# Patient Record
Sex: FEMALE | Race: WHITE | NOT HISPANIC OR LATINO | URBAN - METROPOLITAN AREA
[De-identification: names, ages, dates, MRNs, and addresses within clinical notes are randomized per-mention and may not be internally consistent; named-entity substitution may affect disease eponyms.]

---

## 2019-01-18 ENCOUNTER — APPOINTMENT (RX ONLY)
Dept: URBAN - METROPOLITAN AREA CLINIC 349 | Facility: CLINIC | Age: 39
Setting detail: DERMATOLOGY
End: 2019-01-18

## 2019-01-18 DIAGNOSIS — Z80.8 FAMILY HISTORY OF MALIGNANT NEOPLASM OF OTHER ORGANS OR SYSTEMS: ICD-10-CM

## 2019-01-18 DIAGNOSIS — B07.8 OTHER VIRAL WARTS: ICD-10-CM

## 2019-01-18 DIAGNOSIS — L57.0 ACTINIC KERATOSIS: ICD-10-CM

## 2019-01-18 DIAGNOSIS — D22 MELANOCYTIC NEVI: ICD-10-CM | Status: STABLE

## 2019-01-18 DIAGNOSIS — L71.8 OTHER ROSACEA: ICD-10-CM

## 2019-01-18 DIAGNOSIS — Z71.89 OTHER SPECIFIED COUNSELING: ICD-10-CM

## 2019-01-18 DIAGNOSIS — L81.4 OTHER MELANIN HYPERPIGMENTATION: ICD-10-CM

## 2019-01-18 PROBLEM — D48.5 NEOPLASM OF UNCERTAIN BEHAVIOR OF SKIN: Status: ACTIVE | Noted: 2019-01-18

## 2019-01-18 PROBLEM — D22.5 MELANOCYTIC NEVI OF TRUNK: Status: ACTIVE | Noted: 2019-01-18

## 2019-01-18 PROCEDURE — ? OTHER

## 2019-01-18 PROCEDURE — ? MEDICAL PHOTOGRAPHY REVIEW

## 2019-01-18 PROCEDURE — ? PHOTO-DOCUMENTATION

## 2019-01-18 PROCEDURE — ? BODY PHOTOGRAPHY

## 2019-01-18 PROCEDURE — 17110 DESTRUCTION B9 LES UP TO 14: CPT

## 2019-01-18 PROCEDURE — 99214 OFFICE O/P EST MOD 30 MIN: CPT | Mod: 25

## 2019-01-18 PROCEDURE — ? LIQUID NITROGEN

## 2019-01-18 PROCEDURE — 17000 DESTRUCT PREMALG LESION: CPT | Mod: 59

## 2019-01-18 PROCEDURE — ? LIQUID NITROGEN (COSMETIC)

## 2019-01-18 PROCEDURE — ? PRESCRIPTION

## 2019-01-18 PROCEDURE — ? COUNSELING

## 2019-01-18 PROCEDURE — ? EDUCATIONAL RESOURCES PROVIDED

## 2019-01-18 RX ORDER — IVERMECTIN 10 MG/G
CREAM TOPICAL
Qty: 1 | Refills: 2 | Status: ERX | COMMUNITY
Start: 2019-01-18

## 2019-01-18 RX ADMIN — IVERMECTIN: 10 CREAM TOPICAL at 15:14

## 2019-01-18 ASSESSMENT — LOCATION DETAILED DESCRIPTION DERM
LOCATION DETAILED: RIGHT SUPERIOR MEDIAL UPPER BACK
LOCATION DETAILED: RIGHT MEDIAL MALAR CHEEK
LOCATION DETAILED: RIGHT POSTERIOR ANKLE
LOCATION DETAILED: LEFT DISTAL PRETIBIAL REGION
LOCATION DETAILED: RIGHT CENTRAL MALAR CHEEK
LOCATION DETAILED: INFERIOR THORACIC SPINE
LOCATION DETAILED: LEFT CENTRAL MALAR CHEEK
LOCATION DETAILED: RIGHT MID-UPPER BACK
LOCATION DETAILED: RIGHT MEDIAL UPPER BACK
LOCATION DETAILED: GLABELLA

## 2019-01-18 ASSESSMENT — SEVERITY ASSESSMENT OVERALL AMONG ALL PATIENTS
IN YOUR EXPERIENCE, AMONG ALL PATIENTS YOU HAVE SEEN WITH THIS CONDITION, HOW SEVERE IS THIS PATIENT'S CONDITION?: MILD TO MODERATE

## 2019-01-18 ASSESSMENT — LOCATION ZONE DERM
LOCATION ZONE: TRUNK
LOCATION ZONE: LEG
LOCATION ZONE: FACE

## 2019-01-18 ASSESSMENT — LOCATION SIMPLE DESCRIPTION DERM
LOCATION SIMPLE: UPPER BACK
LOCATION SIMPLE: RIGHT UPPER BACK
LOCATION SIMPLE: LEFT CHEEK
LOCATION SIMPLE: GLABELLA
LOCATION SIMPLE: RIGHT ANKLE
LOCATION SIMPLE: RIGHT CHEEK
LOCATION SIMPLE: LEFT PRETIBIAL REGION

## 2019-01-18 NOTE — PROCEDURE: MEDICAL PHOTOGRAPHY REVIEW
Review Findings: no new or changing lesions
Number Of Photographs Reviewed: 2
Detail Level: Generalized

## 2019-01-18 NOTE — PROCEDURE: OTHER
Note Text (......Xxx Chief Complaint.): This diagnosis correlates with the
Detail Level: Detailed
Other (Free Text): Patient will call back if Soolantra is not working and orcea will be called in.

## 2019-01-18 NOTE — PROCEDURE: BODY PHOTOGRAPHY
Was The Entire Body Photographed (Cannot Bill Unless Entire Body Photographed)?: No
Reason For Photography: The patient is obtaining body photography to observe existing suspicious moles and or monitor for the appearance of any new lesions.
Detail Level: Generalized
Consent: Written consent obtained, risks reviewed for whole body photography. Patient understands that photograph costs may not be covered by insurance, and patient is ultimately responsible for payment.
Number Of Photographs (Optional- Will Not Render If 0): 3
Whole Body Statement: The whole body was photographed today.

## 2019-01-18 NOTE — PROCEDURE: LIQUID NITROGEN (COSMETIC)
Detail Level: Detailed
Render Post-Care Instructions In Note?: no
Post-Care Instructions: I reviewed with the patient in detail post-care instructions. Patient is to wear sunprotection, and avoid picking at any of the treated lesions. Pt may apply Vaseline to crusted or scabbing areas.
Price (Use Numbers Only, No Special Characters Or $): 25
Consent: The patient's consent was obtained including but not limited to risks of crusting, scabbing, blistering, scarring, darker or lighter pigmentary change, recurrence, incomplete removal and infection. The patient understands that the procedure is cosmetic in nature and is not covered by insurance.

## 2019-01-18 NOTE — PROCEDURE: LIQUID NITROGEN
Number Of Freeze-Thaw Cycles: 2 freeze-thaw cycles
Medical Necessity Information: It is in your best interest to select a reason for this procedure from the list below. All of these items fulfill various CMS LCD requirements except the new and changing color options.
Post-Care Instructions: I reviewed with the patient in detail post-care instructions. Patient is to wear sunprotection, and avoid picking at any of the treated lesions. Pt may apply Vaseline to crusted or scabbing areas.
Detail Level: Detailed
Include Z78.9 (Other Specified Conditions Influencing Health Status) As An Associated Diagnosis?: Yes
Medical Necessity Clause: This procedure was medically necessary because the lesions that were treated were:
Consent: The patient's consent was obtained including but not limited to risks of crusting, scabbing, blistering, scarring, darker or lighter pigmentary change, recurrence, incomplete removal and infection.
Duration Of Freeze Thaw-Cycle (Seconds): 5-10
Duration Of Freeze Thaw-Cycle (Seconds): 3
Render Post-Care Instructions In Note?: no

## 2020-01-17 ENCOUNTER — APPOINTMENT (RX ONLY)
Dept: URBAN - METROPOLITAN AREA CLINIC 349 | Facility: CLINIC | Age: 40
Setting detail: DERMATOLOGY
End: 2020-01-17

## 2020-01-17 DIAGNOSIS — L20.89 OTHER ATOPIC DERMATITIS: ICD-10-CM

## 2020-01-17 DIAGNOSIS — L21.8 OTHER SEBORRHEIC DERMATITIS: ICD-10-CM

## 2020-01-17 DIAGNOSIS — Z80.8 FAMILY HISTORY OF MALIGNANT NEOPLASM OF OTHER ORGANS OR SYSTEMS: ICD-10-CM

## 2020-01-17 PROBLEM — L20.84 INTRINSIC (ALLERGIC) ECZEMA: Status: ACTIVE | Noted: 2020-01-17

## 2020-01-17 PROCEDURE — 99214 OFFICE O/P EST MOD 30 MIN: CPT

## 2020-01-17 PROCEDURE — ? COUNSELING

## 2020-01-17 PROCEDURE — ? OTHER

## 2020-01-17 PROCEDURE — ? TREATMENT REGIMEN

## 2020-01-17 PROCEDURE — ? PRESCRIPTION

## 2020-01-17 RX ORDER — TRIAMCINOLONE ACETONIDE 1 MG/G
CREAM TOPICAL
Qty: 1 | Refills: 1 | Status: ERX | COMMUNITY
Start: 2020-01-17

## 2020-01-17 RX ADMIN — TRIAMCINOLONE ACETONIDE: 1 CREAM TOPICAL at 00:00

## 2020-01-17 ASSESSMENT — LOCATION SIMPLE DESCRIPTION DERM
LOCATION SIMPLE: RIGHT LOWER BACK
LOCATION SIMPLE: RIGHT CHEEK
LOCATION SIMPLE: RIGHT EAR
LOCATION SIMPLE: LEFT EYEBROW
LOCATION SIMPLE: UPPER BACK
LOCATION SIMPLE: RIGHT EYEBROW

## 2020-01-17 ASSESSMENT — LOCATION DETAILED DESCRIPTION DERM
LOCATION DETAILED: RIGHT LATERAL EYEBROW
LOCATION DETAILED: INFERIOR THORACIC SPINE
LOCATION DETAILED: RIGHT ANTIHELIX
LOCATION DETAILED: RIGHT LATERAL MALAR CHEEK
LOCATION DETAILED: RIGHT SUPERIOR LATERAL LOWER BACK
LOCATION DETAILED: LEFT LATERAL EYEBROW

## 2020-01-17 ASSESSMENT — SEVERITY ASSESSMENT
SEVERITY: MILD
HOW SEVERE IS THIS PATIENT'S CONDITION?: MILD

## 2020-01-17 ASSESSMENT — LOCATION ZONE DERM
LOCATION ZONE: FACE
LOCATION ZONE: TRUNK
LOCATION ZONE: EAR

## 2020-01-17 NOTE — PROCEDURE: OTHER
Detail Level: Detailed
Other (Free Text): Provided patient with Seborrheic Dermatitis handout
Note Text (......Xxx Chief Complaint.): This diagnosis correlates with the

## 2020-01-17 NOTE — PROCEDURE: TREATMENT REGIMEN
Detail Level: Detailed
Samples Given: Vanicream cream Zbar wash affected area once daily-twice daily

## 2020-09-16 ENCOUNTER — HOSPITAL ENCOUNTER (OUTPATIENT)
Dept: PHYSICAL THERAPY | Age: 40
Discharge: HOME | End: 2020-09-16
Payer: COMMERCIAL

## 2020-09-16 PROCEDURE — 97140 MANUAL THERAPY 1/> REGIONS: CPT

## 2020-09-16 PROCEDURE — 97110 THERAPEUTIC EXERCISES: CPT

## 2020-09-16 PROCEDURE — 97162 PT EVAL MOD COMPLEX 30 MIN: CPT

## 2020-09-16 NOTE — PROGRESS NOTES
[address]  : 1980  Payor: [address] / Plan: 29[address] [facility] [address] / Product Type: Commerical /  Kavon Crumbly at [address] [facility], [facility], [address] [place] [address]  [phone]   [phone]                                                          Redd Amador MD      OUTPATIENT PHYSICAL THERAPY: Daily Treatment Note 2020 Visit Count:  1    Tx Diagnosis:  Pain in Left Shoulder (M25.512)   Lateral epicondylitis, left elbow (M77.12)        Pre-treatment Symptoms/Complaints:  See Initial Eval Dated 2020 for more details. Pain: Initial:4/10 Post Session: 2/10   Medications Last Reviewed:  2020     Updated Objective Findings: See Initial Eval for more details. TREATMENT:   THERAPEUTIC EXERCISE: (12 minutes):  Exercises per grid below to improve mobility, strength and balance. Required minimal visual, verbal and manual cues to promote proper body alignment and promote proper body posture. Progressed resistance and complexity of movement as indicated. Date:  2020 Date:   Date:     Activity/Exercise Parameters Parameters Parameters   Education HEP, POC, PT goals, anatomy/pathology. Tendonopathy rules, Degenerative tendons and progressively loading proniciples. Wrist isometrics 4 min     No money Blue band     rows 30xs blue band                             THERAPEUTIC ACTIVITY: ( 0 minutes): Activities per gid below to improve functional movement related mobility, strength and balance to improve neuro-muscular carryover to daily functional activities for improving patient's quality of life. Required visual, verbal and manual cues to promote proper body alignment and promote proper body posture/mechanics. Progressed resistance and complexity of movement as indicated.      Date:  2020 Date:   Date:     Activity/Exercise Parameters Parameters Parameters                                                                              MANUAL THERAPY: (12 minutes): Joint mobilization, Soft tissue mobilization was utilized and necessary because of the patient's restricted joint motion and restricted motion of soft tissue mobility. Date  9/16/2020    Technique Used Grade Level # Time(s) Effect while being performed   actvie release of Common Extensor Tendon   6 min    PA II,III Radial Head 4 min Improved tolerance of wrist extension                                                   HEP Log Date 1.    9/16/2020   2.  9/16/2020   3. 9/16/2020   4.    5.           [employer]  Treatment/Session Summary:    Response to Treatment: Pt demonstrated understanding of POC and initial HEP. No increase in pain or adverse reactions. Communication/Consultation:  POC, HEP, PT goals, Faxed initial evaluation to MD.   Equipment provided today: HEP Handout     Recommendations/Intent for next treatment session:   Next visit will focus on Manual Therapy Core Stability Dry Needling Traction RTC strengthening soft tissue mobilization. Treatment Plan of Care Effective Dates: 9/16/2020 TO 11/16/2020 (60 days). Frequency/Duration: 2 times a week for 60 Days             Total Treatment Billable Duration:   24  Rx plus Domingo Morse PT    No future appointments.

## 2020-09-16 NOTE — THERAPY EVALUATION
[address]  : 1980      Payor: Marifer Mir S / Plan: 29[address] [facility] [address] / Product Type: Commerical /    [address] [facility], [address], [facility], [address] [place] [address] Road  [phone]   [phone]              OUTPATIENT PHYSICAL THERAPY:Initial Assessment: 2020    ICD-10: Treatment Diagnosis:   Pain in Left Shoulder (M25.512)   Lateral epicondylitis, left elbow (M77.12)              Precautions/Allergies:   Codeine and Sulfa (sulfonamide antibiotics)   Fall Risk Score: 0 (? 5 = High Risk)  MD Orders: Eval and Treat  MEDICAL/REFERRING DIAGNOSIS:  Pain in left shoulder [M25.512]  Lesion of radial nerve, left upper limb [G56.32]   DATE OF ONSET: 6 months  REFERRING PHYSICIAN: Kateryna Castellanos MD  RETURN PHYSICIAN APPOINTMENT: TBD by patient      INITIAL ASSESSMENT:   Ms. Nelida Sheets presents to physical therapy with decreased strength, ROM, joint mobility, functional mobility. These S/S are consistent with laterl epicondylitis. Patient will benefit from skilled physical therapy for manual therapeutic techniques (as appropriate), therapeutic exercises and activities, neuromuscular re-education, and comprehensive home exercises program to address current impairments and functional limitations. [address] will benefit from skilled PT (medically necessary) in order to address above deficits affecting participation in basic ADLs and overall functional tolerance. PROBLEM LIST (Impacting functional limitations):  · Decreased Strength  · Decreased ADL/Functional Activities  · Increased Pain  · Decreased Activity Tolerance  · Increased Shortness of Breath  · Decreased Flexibility/Joint Mobility  · Decreased Centre with Home Exercise Program INTERVENTIONS PLANNED:  1. Cold  2. Family Education  3. Home Exercise Program (HEP)  4. Manual Therapy  5. Neuromuscular Re-education/Strengthening  6.  Range of Motion (ROM)  7. Therapeutic Activites  8. Therapeutic Exercise/Strengthening  9. Transfer Training  10. Ultrasound   TREATMENT PLAN:  Effective Dates: 9/16/2020 TO 11/16/2020 (60 days). Frequency/Duration: 2 times a week for 60 Days  GOALS: (Goals have been discussed and agreed upon with patient.)     Short-Term Goals~4 weeks  Goal Met   1. Sarwat Adams will report <=2/10 pain with don/doffing clothing as well as minimal/no difficulty. 1.  [] Date:   2. Sarwat Adams will demonstrate improvement in active wrisyt extension to >90 degrees to increase UE function and participation in ADLs. 2.  [] Date:   3. Sarwat Adams will demonstrate demonstrate improvement in active elbow flexion to >140 degrees to increase UE function and participation in ADLs. 3.  [] Date:   4. Sarwat Adams will show a greater than 8 point decrease on the DASH in order to show an increase in upper extremity function. 4.  [] Date:   5. Sarwat Adams will be independent in all HEP 5.  [] Date:   6.  6.  [] Date:         Long Term Goals~8 weeks Goal Met   1. Sarwat Adams will show full ROM of the UE in order to return to full functional mobility  1. [] Date:   2. Sarwat Adams will show a greater than 15 point decrease on the DASH in order to show an increase in upper extremity function 2. [] Date:   3. Sarwat Adams will report doing hair/bathing without difficulty and <=2/10 pain in order to be independent with ADL's 3.  [] Date:   4. Sarwat Adams will be independent in all advanced HEP 4.  [] Date:            Outcome Measure: Tool Used: Disabilities of the Arm, Shoulder and Hand (DASH) Questionnaire - Quick Version    Score:  Initial: 29.5% Most Recent: X/55 (Date: -- )   Interpretation of Score: The DASH is designed to measure the activities of daily living in person's with upper extremity dysfunction or pain.   Each section is scored on a 1-5 scale, 5 representing the greatest disability. The scores of each section are added together for a total score of 55. Medical Necessity:   · Skilled intervention continues to be required due to deficits and impairments seen upon initial evaluation affecting patient's participation in ADLs and functional tasks. Reason for Services/Other Comments:  · Patient continues to require skilled intervention due to deficits and impairments seen upon initial evaluation affecting patient's participation in ADLs and functional tasks. Total Treatment Duration:  PT Patient Time In/Time Out  Time In: 1606  Time Out: 3143    Rehabilitation Potential For Stated Goals: good  Regarding Paulette Sutton Chuck's therapy, I certify that the treatment plan above will be carried out by a therapist or under their direction. Thank you for this referral,  Bucky Lou PT     Referring Physician Signature: Shannen Marin MD              Date                    HISTORY:   History of Present Injury/Illness (Reason for Referral):  Pt reports that for the past 6 months her forearm has been bothering her for some time and has now progressed with ADL's and work related functions. Pt did receive and injection with helped for a week but is getting frustrated with her progressed. -Present symptoms/complaints (on day of evaluation)  Pain Scale:  · Current: 4/10  · Best: 2/10  · Worst: 7/10    · Aggravating factors: Lifting, Carrying, Placing cup on top shelf, Overhead activities, Reaching for wallet, carrying a child, gripping and throwing  · Relieving factors: rest by side  · Irritability: High (onset of Pain is shorter than alleviation of Pain)    Dominant Side:  right  Past Medical History/Comorbidities:   Ms. Feng Ryan  has a past medical history of Abnormal Papanicolaou smear of cervix, Depression, and HSV (herpes simplex virus) infection. She also has no past medical history of GERD (gastroesophageal reflux disease).   Ms. Rowena Benjamin  has a past surgical history that includes hx wisdom teeth extraction (about 1999); hx cholecystectomy; and hx gyn (2009). Social History/Living Environment:      Prior Level of Function/Work/Activity:  Normal  Other Clinical Tests:         X-RAY Negative for RTC  Current Medications:    Current Outpatient Medications:     clindamycin (CLINDAGEL) 1 % topical gel, Apply  to affected area two (2) times a day. use thin film on affected area, Disp: 60 g, Rfl: 5    buPROPion (WELLBUTRIN) 75 mg tablet, Take 1 Tab by mouth two (2) times a day., Disp: 60 Tab, Rfl: 0    multivitamin (ONE A DAY) tablet, Take 1 Tab by mouth daily. , Disp: , Rfl:     buPROPion XL (WELLBUTRIN XL) 150 mg tablet, Take 1 Tab by mouth every morning., Disp: 30 Tab, Rfl: 11    levonorgestrel (MIRENA) 20 mcg/24 hr (5 years) IUD, 1 Each by IntraUTERine route once., Disp: , Rfl:         Ambulatory/Rehab Services H2 Model Falls Risk Assessment    Risk Factors:       No Risk Factors Identified Ability to Rise from Chair:       (0)  Ability to rise in a single movement    Falls Prevention Plan:       No modifications necessary   Total: (5 or greater = High Risk): 0    ©2010 [facility] [employer]. All Rights Reserved. [facility] Patent #[id number]. Federal Law prohibits the replication, distribution or use without written permission from [facility] [employer]       Date Last Reviewed:  9/17/2020   Number of Personal Factors/Comorbidities that affect the Plan of Care: 1-2: MODERATE COMPLEXITY   EXAMINATION:   Observation/Orthostatic Postural Assessment:     Forward Head and Rounded Shoulders  Palpation:          Increased tenderness   ROM:    AROM/PROM         Joint: Eval Date: 9/16/2020  Re-Assess Date:  Re-Assess Date:    ACTIVE ROM (standing) RIGHT LEFT RIGHT LEFT RIGHT LEFT   Shoulder Flexion             Shoulder Abduction             Shoulder Internal Rotation (Apley's)             Shoulder External Rotation (Apley's)            Elbow ROM             PASSIVE ROM (supine)             Shoulder Flexion             Shoulder Abduction   Garcia  Garcia        Shoulder Internal Rotation   Fredie Boys  Garcia  Garcia    Shoulder External Rotation                                 Fredie Boys                   Strength:    Joint: Eval Date: 9/16/2020  Re-Assess Date:  Re-Assess Date:     RIGHT LEFT RIGHT LEFT RIGHT LEFT   Shoulder Flexion 4+/5 4/5           Shoulder Abduction  (C5) 4+/5 4+/5           Shoulder Internal Rotation 4+/5 4+/5           Shoulder External Rotation 4+/5 4+/5           Elbow Flexion  (C6) 4+/5 4+/5           Elbow Extension (C7) 5/5 5/5           Wrist Flexion (C7) 5/5 5/5           Wrist Extension (C6) 4+/5 4+/5           Resisted Thumb Extension/Finger Abduction (C8/T1)             Resisted Cervical Rotation (C1):             Resisted Shoulder Shrug (C2, 3, 4):               Strength 42 lbs 50 lbs                                                      Manual:  Joint Directon Grade Treatment Effect   Radial HEad PA II and III Improved Symptoms post treatment                   Special Tests:     Neer's: Negative   Painful Arc    Neurological Screen: Assessed @ Initial Visit    Radiating symptoms? Yes/No=poorly localized  Functional Mobility:  Assessed @ Initial Visit         Body Structures Involved:  1. Bones  2. Joints  3. Muscles  4. Ligaments Body Functions Affected:  1. Sensory/Pain  2. Neuromusculoskeletal  3. Movement Related Activities and Participation Affected:  1. Mobility  2.  Self Care   Number of elements that affect the Plan of Care: 3: MODERATE COMPLEXITY   CLINICAL PRESENTATION:   Presentation: Evolving clinical presentation with changing clinical characteristics: MODERATE COMPLEXITY   CLINICAL DECISION MAKING:      Use of outcome tool(s) and clinical judgement create a POC that gives a: Questionable prediction of patient's progress: MODERATE COMPLEXITY     See associated treatment note for treatment provided today.     Future Appointments   Date Time Provider Heidy Hooper   9/23/2020  4:00 PM Jose F Yepez, PT [facility] [place]         Shaan Nunez PT

## 2020-09-23 ENCOUNTER — HOSPITAL ENCOUNTER (OUTPATIENT)
Dept: PHYSICAL THERAPY | Age: 40
Discharge: HOME | End: 2020-09-23
Payer: COMMERCIAL

## 2020-09-23 PROCEDURE — 97110 THERAPEUTIC EXERCISES: CPT

## 2020-09-23 NOTE — PROGRESS NOTES
[address]  : 1980  Payor: [address] / Plan: 2900 [facility] [address] / Product Type: Commerical /  [address] [facility], [facility], [address] [place] [address]  [phone]   [phone]                                                          Erwin Broderick MD      OUTPATIENT PHYSICAL THERAPY: Daily Treatment Note 2020 Visit Count:  2    Tx Diagnosis:  Pain in Left Shoulder (M25.512)   Lateral epicondylitis, left elbow (M77.12)        Pre-treatment Symptoms/Complaints:  Pt reports that her arm felt so much better after her needling. Pain: Initial:0/10 Post Session: 5/10   Medications Last Reviewed:  2020     Updated Objective Findings: See Initial Eval for more details. TREATMENT:   THERAPEUTIC EXERCISE: (38 minutes):  Exercises per grid below to improve mobility, strength and balance. Required minimal visual, verbal and manual cues to promote proper body alignment and promote proper body posture. Progressed resistance and complexity of movement as indicated. Date:  2020 Date:  20 Date:  2020   Activity/Exercise Parameters Parameters Parameters   Education HEP, POC, PT goals, anatomy/pathology. Tendonopathy rules, Degenerative tendons and progressively loading proniciples. Wrist isometrics 4 min 40\" on 40\" off 10 seconds 10xs   No money Blue band Green 10x10\" 20xs grn   rows 30xs blue band 2x10x 27#    UBE for warm up  4 min 6 min   Trp DN education  5 mins    Lat pull downs  27# 3x10 27# 3x10   rows    23 3x10   Butterfly stroke   2x10    Velásquez carry   10 lbs 4x 150 ft                     THERAPEUTIC ACTIVITY: ( 0 minutes): Activities per gid below to improve functional movement related mobility, strength and balance to improve neuro-muscular carryover to daily functional activities for improving patient's quality of life.  Required visual, verbal and manual cues to promote proper body alignment and promote proper body posture/mechanics. Progressed resistance and complexity of movement as indicated. Date:  9/23/2020 Date:   Date:     Activity/Exercise Parameters Parameters Parameters                                                                               MANUAL THERAPY: (8 minutes): Joint mobilization, Soft tissue mobilization was utilized and necessary because of the patient's restricted joint motion and restricted motion of soft tissue mobility. Date  9/23/2020    Technique Used Grade Level # Time(s) Effect while being performed                                                                     HEP Log Date 1.    9/23/2020   2.  9/23/2020   3. 9/23/2020   4.    5.           [employer]  Treatment/Session Summary:    Response to Treatment: Pt progressed in adjusted loading to prevent increased soreness after exercises. mild aggaravation with carrying. Pt educated on tendinapothy rules to improve soreness scales. Communication/Consultation:  POC, HEP, PT goals, Faxed initial evaluation to MD.   Equipment provided today: HEP Handout     Recommendations/Intent for next treatment session:   Next visit will focus on Manual Therapy Core Stability Dry Needling Traction RTC strengthening soft tissue mobilization. Treatment Plan of Care Effective Dates: 9/16/2020 TO 11/16/2020 (60 days).   Frequency/Duration: 2 times a week for 60 Days             Total Treatment Billable Duration: 40 mins  PT Patient Time In/Time Out  Time In: 1601  Time Out: [address]ilo PT    Future Appointments   Date Time Provider Heidy Hooper   9/28/2020  4:00 PM Cynthia Gilmore PT [facility]   9/30/2020  4:00 PM Cynthia Gilmore PT [facility]   10/5/2020  5:00 PM Cynthia Gilmore PT [facility]   10/28/2020  7:45 AM Flash Ellington NP Janes Handler

## 2020-09-28 ENCOUNTER — HOSPITAL ENCOUNTER (OUTPATIENT)
Dept: PHYSICAL THERAPY | Age: 40
Discharge: HOME | End: 2020-09-28
Payer: COMMERCIAL

## 2020-09-28 PROCEDURE — 20560 NDL INSJ W/O NJX 1 OR 2 MUSC: CPT

## 2020-09-28 PROCEDURE — 97110 THERAPEUTIC EXERCISES: CPT

## 2020-09-28 NOTE — PROGRESS NOTES
[address]  : 1980  Payor: 150Atiya Mir S / Plan: 2900 [facility] [address] / Product Type: Nicanorl /  John Kitchen at 4 [facility]. [address] 7500 [facility], [facility], [address] [place] [address] Road  [phone]   [phone]                                                          Elizabeth Diaz MD      OUTPATIENT PHYSICAL THERAPY: Daily Treatment Note 2020 Visit Count:  3    Tx Diagnosis:  Pain in Left Shoulder (M25.512)   Lateral epicondylitis, left elbow (M77.12)        Pre-treatment Symptoms/Complaints:  Pt repoorts that she can notice an improvement but she was very sore after last visit. Pain: Initial:0/10 Post Session: 5/10   Medications Last Reviewed:  2020     Updated Objective Findings: See Initial Eval for more details. TREATMENT:   THERAPEUTIC EXERCISE: (24 minutes):  Exercises per grid below to improve mobility, strength and balance. Required minimal visual, verbal and manual cues to promote proper body alignment and promote proper body posture. Progressed resistance and complexity of movement as indicated. Date:  2020 Date:  20 Date:  2020 Date  2020   Activity/Exercise Parameters Parameters Parameters    Education HEP, POC, PT goals, anatomy/pathology. Tendonopathy rules, Degenerative tendons and progressively loading proniciples. Wrist isometrics 4 min 40\" on 40\" off 10 seconds 10xs    No money Blue band Green 10x10\" 20xs grn    rows 30xs blue band 2x10x 27#     UBE for warm up  4 min 6 min    Trp DN education  5 mins     Lat pull downs  27# 3x10 27# 3x10 27lbs 3x10   rows    23 3x10 23lbs 3x10   Butterfly stroke   2x10     Velásquez carry   10 lbs 4x 150 ft 10 lbs 4x 150 ft   Wrist extension     2lbs 2x10                THERAPEUTIC ACTIVITY: ( 0 minutes):  Activities per gid below to improve functional movement related mobility, strength and balance to improve neuro-muscular carryover to daily functional activities for improving patient's quality of life. Required visual, verbal and manual cues to promote proper body alignment and promote proper body posture/mechanics. Progressed resistance and complexity of movement as indicated. Date:  9/28/2020 Date:   Date:     Activity/Exercise Parameters Parameters Parameters                                                                               MANUAL THERAPY: (24 minutes): Joint mobilization, Soft tissue mobilization was utilized and necessary because of the patient's restricted joint motion and restricted motion of soft tissue mobility. Date  9/28/2020    Technique Used Grade Level # Time(s) Effect while being performed   Dry needling   24                                                  Dry Needles Used: 2   Dry Needles Removed: 2   Hot pack 10 minutes               HEP Log Date 1.    9/28/2020   2.  9/28/2020   3. 9/28/2020   4.    5.           [employer] Portal  Treatment/Session Summary:    Response to Treatment: Pt responded well to needling but was encouraged to take time during exercises to ease soreness in her forearm. decrease in symptoms after heat. Communication/Consultation:  POC, HEP, PT goals, Faxed initial evaluation to MD.   Equipment provided today: HEP Handout     Recommendations/Intent for next treatment session:   Next visit will focus on Manual Therapy Core Stability Dry Needling Traction RTC strengthening soft tissue mobilization. Treatment Plan of Care Effective Dates: 9/16/2020 TO 11/16/2020 (60 days).   Frequency/Duration: 2 times a week for 60 Days             Total Treatment Billable Duration: 48 mins  PT Patient Time In/Time Out  Time In: 0724  Time Out: [facility], PT    Future Appointments   Date Time Provider Heidy Hooper   9/30/2020  4:00 PM Dorys Zhou PT [facility] AND [facility]   10/5/2020  5:00 PM Dorys Zhou PT [facility] 10/28/2020  7:45 AM TIMO Colbert

## 2020-09-30 ENCOUNTER — HOSPITAL ENCOUNTER (OUTPATIENT)
Dept: PHYSICAL THERAPY | Age: 40
Discharge: HOME | End: 2020-09-30
Payer: COMMERCIAL

## 2020-09-30 NOTE — PROGRESS NOTES
Lobito Beachkaruna Gray  : 1980  Payor: Marifer Mir S / Plan: 2900 [facility] [address] / Product Type: Commerical /  [address] at [address] [place]. [address] 7500 [facility], [facility], [address] [place] [address]  [phone]   [phone]        OUTPATIENT DAILY NOTE    NAME: Lobito Knutson Isaac    DATE: 2020    Patient Cancelled physical therapy appointment today due to Work Conflict. Will follow up next appointment.     Racheal Rendon, PT    Future Appointments   Date Time Provider Heidy Hooper   2020  7:00 PM Talha Strong [facility] AND [facility]   10/5/2020  5:00 PM Cj Eugene PT [facility]   10/28/2020  7:45 AM TIMO Colbert

## 2020-10-05 ENCOUNTER — HOSPITAL ENCOUNTER (OUTPATIENT)
Dept: PHYSICAL THERAPY | Age: 40
Discharge: HOME | End: 2020-10-05
Payer: COMMERCIAL

## 2020-10-05 PROCEDURE — 97110 THERAPEUTIC EXERCISES: CPT

## 2020-10-05 PROCEDURE — 20560 NDL INSJ W/O NJX 1 OR 2 MUSC: CPT

## 2020-10-05 NOTE — PROGRESS NOTES
[address]  : 1980  Payor: [id number] Twila Mir S / Plan: 2900 [address] / Product Type: Nicanorl /  [address] [place] [address] at [address] [place] [address]. Pee Ivan., [address] [place] [address], [address] [place] [address]  [phone]   [phone]                                                          Mary Ovalle MD      OUTPATIENT PHYSICAL THERAPY: Daily Treatment Note 10/5/2020 Visit Count:  4    Tx Diagnosis:  Pain in Left Shoulder (M25.512)   Lateral epicondylitis, left elbow (M77.12)        Pre-treatment Symptoms/Complaints:  Pt reports that her elbow pain is centralized just just above the elbow   Pain: Initial:0/10 Post Session: 5/10   Medications Last Reviewed:  10/5/2020     Updated Objective Findings: See Initial Eval for more details. TREATMENT:   THERAPEUTIC EXERCISE: (15 minutes):  Exercises per grid below to improve mobility, strength and balance. Required minimal visual, verbal and manual cues to promote proper body alignment and promote proper body posture. Progressed resistance and complexity of movement as indicated. Date:  2020 Date:  20 Date:  2020 Date  2020 Date  10/5/2020   Activity/Exercise Parameters Parameters Parameters     Education HEP, POC, PT goals, anatomy/pathology. Tendonopathy rules, Degenerative tendons and progressively loading proniciples. Wrist isometrics 4 min 40\" on 40\" off 10 seconds 10xs     No money Blue band Green 10x10\" 20xs grn     rows 30xs blue band 2x10x 27#      UBE for warm up  4 min 6 min     Trp DN education  5 mins      Lat pull downs  27# 3x10 27# 3x10 27lbs 3x10 30 3x10   rows    23 3x10 23lbs 3x10 27 3x10   Butterfly stroke   2x10      Velásquez carry   10 lbs 4x 150 ft 10 lbs 4x 150 ft 10 lbs 4xs 150ft   Wrist extension     2lbs 2x10    Hammer curls     5lbs 3x10         THERAPEUTIC ACTIVITY: ( 0 minutes):  Activities per gid below to improve functional movement related mobility, strength and balance to improve neuro-muscular carryover to daily functional activities for improving patient's quality of life. Required visual, verbal and manual cues to promote proper body alignment and promote proper body posture/mechanics. Progressed resistance and complexity of movement as indicated. Date:  10/5/2020 Date:   Date:     Activity/Exercise Parameters Parameters Parameters                                                                               MANUAL THERAPY: (15 minutes): Joint mobilization, Soft tissue mobilization was utilized and necessary because of the patient's restricted joint motion and restricted motion of soft tissue mobility. Date  10/5/2020    Technique Used Grade Level # Time(s) Effect while being performed   Dry needling   15                                                  Dry Needles Used: 2   Dry Needles Removed: 2         Hot pack 6 minutes           HEP Log Date 1.    10/5/2020   2.  10/5/2020   3. 10/5/2020   4.    5.           [employer] Portal  Treatment/Session Summary:    Response to Treatment: Pt responded well to needling but was encouraged to take time during exercises to ease soreness in her forearm. decrease in symptoms after heat. Communication/Consultation:  POC, HEP, PT goals, Faxed initial evaluation to MD.   Equipment provided today: HEP Handout     Recommendations/Intent for next treatment session:   Next visit will focus on Manual Therapy Core Stability Dry Needling Traction RTC strengthening soft tissue mobilization. Treatment Plan of Care Effective Dates: 9/16/2020 TO 11/16/2020 (60 days).   Frequency/Duration: 2 times a week for 60 Days             Total Treatment Billable Duration: 30 mins  PT Patient Time In/Time Out  Time In: 1376  Time Out: [address] Silvana Dykes, JIMBO    Future Appointments   Date Time Provider Heidy Hooper   10/9/2020  1:00 PM Cj Eugene, PT [facility] 10/12/2020  5:00 PM Almaz Rowe, PT [facility]   10/14/2020  4:00 PM Almaz Rowe, PT [facility]   10/19/2020  5:00 PM Alamz Rowe, PT [facility] AND [facility]   10/21/2020  4:00 PM Almaz Rowe, PT [facility] AND [facility]   10/28/2020  7:45 AM TIMO Fernandes

## 2020-10-09 ENCOUNTER — HOSPITAL ENCOUNTER (OUTPATIENT)
Dept: PHYSICAL THERAPY | Age: 40
Discharge: HOME | End: 2020-10-09
Payer: COMMERCIAL

## 2020-10-09 PROCEDURE — 97110 THERAPEUTIC EXERCISES: CPT

## 2020-10-09 NOTE — PROGRESS NOTES
[address]  : 1980  Payor: Marifer Mir S / Plan: 2900 [facility] 30 [facility] / Product Type: Nicanorl /  [address] [place] [address] at [address] [place] [address]. [address] 7500 [facility], [facility], [address] [place] [address]  [phone]   [phone]                                                          Guanako Salas MD      OUTPATIENT PHYSICAL THERAPY: Daily Treatment Note 10/9/2020 Visit Count:  5    Tx Diagnosis:  Pain in Left Shoulder (M25.512)   Lateral epicondylitis, left elbow (M77.12)        Pre-treatment Symptoms/Complaints:  Pt reports a noticeable improvement in pain in her upper arm but not completely gone  Pain: Initial:0/10 Post Session: 5/10   Medications Last Reviewed:  10/9/2020     Updated Objective Findings: See Initial Eval for more details. TREATMENT:   THERAPEUTIC EXERCISE: (48 minutes):  Exercises per grid below to improve mobility, strength and balance. Required minimal visual, verbal and manual cues to promote proper body alignment and promote proper body posture. Progressed resistance and complexity of movement as indicated. Date:  2020 Date:  20 Date:  2020 Date  2020 Date  10/5/2020 Date  10/9/2020   Activity/Exercise Parameters Parameters Parameters      Education HEP, POC, PT goals, anatomy/pathology. Tendonopathy rules, Degenerative tendons and progressively loading proniciples.         Wrist isometrics 4 min 40\" on 40\" off 10 seconds 10xs      No money Blue band Green 10x10\" 20xs grn      rows 30xs blue band 2x10x 27#       UBE for warm up  4 min 6 min   6 min   Trp DN education  5 mins       Lat pull downs  27# 3x10 27# 3x10 27lbs 3x10 30 3x10 33lbs 3x10   rows    23 3x10 23lbs 3x10 27 3x10 30lbs 3x10   Butterfly stroke   2x10       Velásquez carry   10 lbs 4x 150 ft 10 lbs 4x 150 ft 10 lbs 4xs 150ft 10 lbs 4xs 150ft   Wrist extension     2lbs 2x10     Hammer curls     5lbs 3x10 5lbs 3x10   Edy twist Blue band 30xs   Manual resistance      Eccentric wrist extension 3 x8   Shoulder ER at 90      grn ban 3x 8   pec stretch      3 min   ER isometric      5 seconds 10xs                  THERAPEUTIC ACTIVITY: ( 0 minutes): Activities per gid below to improve functional movement related mobility, strength and balance to improve neuro-muscular carryover to daily functional activities for improving patient's quality of life. Required visual, verbal and manual cues to promote proper body alignment and promote proper body posture/mechanics. Progressed resistance and complexity of movement as indicated. Date:  10/9/2020 Date:   Date:     Activity/Exercise Parameters Parameters Parameters                                                                               MANUAL THERAPY: (0 minutes): Joint mobilization, Soft tissue mobilization was utilized and necessary because of the patient's restricted joint motion and restricted motion of soft tissue mobility. Date  10/9/2020    Technique Used Grade Level # Time(s) Effect while being performed                                                        Hot pack 6 minutes           HEP Log Date 1.    10/9/2020   2.  10/9/2020   3. 10/9/2020   4.    5.           [employer] Portal  Treatment/Session Summary:    Response to Treatment: Pt continued with strengthening to improve scapular strength. Pat self reported less than 4/10 entire treatment. Communication/Consultation:  POC, HEP, PT goals, Faxed initial evaluation to MD.   Equipment provided today: HEP Handout     Recommendations/Intent for next treatment session:   Next visit will focus on Manual Therapy Core Stability Dry Needling Traction RTC strengthening soft tissue mobilization. Treatment Plan of Care Effective Dates: 9/16/2020 TO 11/16/2020 (60 days).   Frequency/Duration: 2 times a week for 60 Days             Total Treatment Billable Duration: 48 mins  PT Patient Time In/Time Out  Time In: 1258  Time Out: [address], PT    Future Appointments   Date Time Provider Heidy Sandie   10/12/2020  5:00 PM Rosalina Man, PT [facility]   10/14/2020  4:00 PM Rosalina Man, PT [facility]   10/19/2020  5:00 PM Rosalina Man, PT [facility]   10/21/2020  4:00 PM Rosalina Man, PT [facility]   10/28/2020  7:45 AM TIMO Gonzalez

## 2020-10-12 ENCOUNTER — APPOINTMENT (OUTPATIENT)
Dept: PHYSICAL THERAPY | Age: 40
End: 2020-10-12
Payer: COMMERCIAL

## 2020-10-14 ENCOUNTER — HOSPITAL ENCOUNTER (OUTPATIENT)
Dept: PHYSICAL THERAPY | Age: 40
Discharge: HOME | End: 2020-10-14
Payer: COMMERCIAL

## 2020-10-14 ENCOUNTER — APPOINTMENT (OUTPATIENT)
Dept: PHYSICAL THERAPY | Age: 40
End: 2020-10-14
Payer: COMMERCIAL

## 2020-10-14 PROCEDURE — 97110 THERAPEUTIC EXERCISES: CPT

## 2020-10-14 NOTE — PROGRESS NOTES
[address]  : 1980  Payor: Marifer Mir S / Plan: 2900 [facility] [address] [facility] [address] / Product Type: Commerical /  [address] at [facility] 94. [address] 7500 [facility], [facility], [address] [place] [address]  [phone]   [phone]                                                          Mariah Dallas MD      OUTPATIENT PHYSICAL THERAPY: Daily Treatment Note 10/14/2020 Visit Count:  6    Tx Diagnosis:  Pain in Left Shoulder (M25.512)   Lateral epicondylitis, left elbow (M77.12)        Pre-treatment Symptoms/Complaints:  Pt reports that her pain is getting better slowly but she can tell a dfference with some exercises. Pain: Initial:0/10 Post Session: 510   Medications Last Reviewed:  10/14/2020     Updated Objective Findings: See Initial Eval for more details. TREATMENT:   THERAPEUTIC EXERCISE: (44 minutes):  Exercises per grid below to improve mobility, strength and balance. Required minimal visual, verbal and manual cues to promote proper body alignment and promote proper body posture. Progressed resistance and complexity of movement as indicated.      Date:  20 Date:  2020 Date  2020 Date  10/5/2020 Date  10/9/2020 Date  10/14/2020   Activity/Exercise Parameters Parameters       Education         Wrist isometrics 40\" on 40\" off 10 seconds 10xs       No money Green 10x10\" 20xs grn       rows 2x10x 27#        UBE for warm up 4 min 6 min   6 min 6 min   Trp DN education 5 mins        Lat pull downs 27# 3x10 27# 3x10 27lbs 3x10 30 3x10 33lbs 3x10 37 3x8   rows   23 3x10 23lbs 3x10 27 3x10 30lbs 3x10 33 3x8   Butterfly stroke  2x10        Velásquez carry  10 lbs 4x 150 ft 10 lbs 4x 150 ft 10 lbs 4xs 150ft 10 lbs 4xs 150ft 15xs 4 laps 150ft   Wrist extension    2lbs 2x10   grn band 3x10   Hammer curls    5lbs 3x10 5lbs 3x10    Edy twist     Blue band 30xs    Manual resistance     Eccentric wrist extension 3 x8 Eccentric wrist extension 3 x8   Shoulder ER at 90     grn ban 3x 8 grn ban 3x 8   pec stretch     3 min    ER isometric     5 seconds 10xs    HSR      7 lbs 3x10         THERAPEUTIC ACTIVITY: ( 0 minutes): Activities per gid below to improve functional movement related mobility, strength and balance to improve neuro-muscular carryover to daily functional activities for improving patient's quality of life. Required visual, verbal and manual cues to promote proper body alignment and promote proper body posture/mechanics. Progressed resistance and complexity of movement as indicated. Date:  10/14/2020 Date:   Date:     Activity/Exercise Parameters Parameters Parameters                                                                               MANUAL THERAPY: (0 minutes): Joint mobilization, Soft tissue mobilization was utilized and necessary because of the patient's restricted joint motion and restricted motion of soft tissue mobility. Date  10/14/2020    Technique Used Grade Level # Time(s) Effect while being performed                                                        Hot pack 6 minutes           HEP Log Date 1.    10/14/2020   2.  10/14/2020   3. 10/14/2020   4.    5.           [employer] Portal  Treatment/Session Summary:    Response to Treatment: Pt continued with progressions as tolerated to increase loading of common extensor tendon. MIld provocation of symptoms that improved with rest.   Communication/Consultation:  POC, HEP, PT goals, Faxed initial evaluation to MD.   Equipment provided today: HEP Handout     Recommendations/Intent for next treatment session:   Next visit will focus on Manual Therapy Core Stability Dry Needling Traction RTC strengthening soft tissue mobilization. Treatment Plan of Care Effective Dates: 9/16/2020 TO 11/16/2020 (60 days).   Frequency/Duration: 2 times a week for 60 Days             Total Treatment Billable Duration: 44 mins  PT Patient Time In/Time Out  Time In: 1700  Time Out: 2900 Zulma Vasquez PT    Future Appointments   Date Time Provider Heidy Hooper   10/19/2020  5:00 PM Odalys West, JIMBO [facility] AND [facility]   10/21/2020  4:00 PM Odalys West PT [facility] AND [facility]   10/28/2020  7:45 AM TIMO Wright

## 2020-10-19 ENCOUNTER — HOSPITAL ENCOUNTER (OUTPATIENT)
Dept: PHYSICAL THERAPY | Age: 40
Discharge: HOME | End: 2020-10-19
Payer: COMMERCIAL

## 2020-10-19 PROCEDURE — 97140 MANUAL THERAPY 1/> REGIONS: CPT

## 2020-10-19 PROCEDURE — 97110 THERAPEUTIC EXERCISES: CPT

## 2020-10-19 NOTE — PROGRESS NOTES
[address]  : 1980  Payor: Marifer Mir S / Plan: 2900 [facility] [address] [facility] [address] / Product Type: Commerical /  [address] at [address] [place]. [address] 7500 [facility], [facility], [address] [place] [address]  [phone]   [phone]                                                          Claudia Officer, MD      OUTPATIENT PHYSICAL THERAPY: Daily Treatment Note 10/19/2020 Visit Count:  7    Tx Diagnosis:  Pain in Left Shoulder (M25.512)   Lateral epicondylitis, left elbow (M77.12)        Pre-treatment Symptoms/Complaints:  Pt reports that she did alot of yardwork yesterday and her forearm is really feeling it. Pain: Initial:0/10 Post Session: 5/10   Medications Last Reviewed:  10/19/2020     Updated Objective Findings: See Initial Eval for more details. TREATMENT:   THERAPEUTIC EXERCISE: (15 minutes):  Exercises per grid below to improve mobility, strength and balance. Required minimal visual, verbal and manual cues to promote proper body alignment and promote proper body posture. Progressed resistance and complexity of movement as indicated. Date:  2020 Date  2020 Date  10/5/2020 Date  10/9/2020 Date  10/14/2020 Date  10/19/2020   Activity/Exercise Parameters        Education      edcuation on palliative care. Diff DX and improving pain.    Wrist isometrics 10 seconds 10xs     4 min   No money 20xs grn        rows         UBE for warm up 6 min   6 min 6 min 4 min   Trp DN education         Lat pull downs 27# 3x10 27lbs 3x10 30 3x10 33lbs 3x10 37 3x8    rows  23 3x10 23lbs 3x10 27 3x10 30lbs 3x10 33 3x8    Butterfly stroke 2x10         Velásquez carry 10 lbs 4x 150 ft 10 lbs 4x 150 ft 10 lbs 4xs 150ft 10 lbs 4xs 150ft 15xs 4 laps 150ft    Wrist extension   2lbs 2x10   grn band 3x10    Hammer curls   5lbs 3x10 5lbs 3x10     Edy twist    Blue band 30xs     Manual resistance    Eccentric wrist extension 3 x8 Eccentric wrist extension 3 x8    Shoulder ER at 90    grn ban 3x 8 grn ban 3x 8    pec stretch    3 min     ER isometric    5 seconds 10xs     HSR     7 lbs 3x10          THERAPEUTIC ACTIVITY: ( 0 minutes): Activities per gid below to improve functional movement related mobility, strength and balance to improve neuro-muscular carryover to daily functional activities for improving patient's quality of life. Required visual, verbal and manual cues to promote proper body alignment and promote proper body posture/mechanics. Progressed resistance and complexity of movement as indicated. Date:  10/19/2020 Date:   Date:     Activity/Exercise Parameters Parameters Parameters                                                                               MANUAL THERAPY: (10 minutes): Joint mobilization, Soft tissue mobilization was utilized and necessary because of the patient's restricted joint motion and restricted motion of soft tissue mobility. Date  10/19/2020    Technique Used Grade Level # Time(s) Effect while being performed   Soft tissue massage  Common extensor tendon 10 min                                                  ICE pack 10 min           HEP Log Date 1.    10/19/2020   2.  10/19/2020   3. 10/19/2020   4.    5.           [employer] Portal  Treatment/Session Summary:    Response to Treatment: Pt came into therapy with increased pain and warmth. The goal for todays session was to decrease inflamation and improve pain. Communication/Consultation:  POC, HEP, PT goals, Faxed initial evaluation to MD.   Equipment provided today: HEP Handout     Recommendations/Intent for next treatment session:   Next visit will focus on Manual Therapy Core Stability Dry Needling Traction RTC strengthening soft tissue mobilization. Treatment Plan of Care Effective Dates: 9/16/2020 TO 11/16/2020 (60 days).   Frequency/Duration: 2 times a week for 60 Days             Total Treatment Billable Duration: 25 mins    TIME IN: [address]    TIME OUT: [address] Mckeon, PT    Future Appointments   Date Time Provider Heidy Mcgilli   10/21/2020  4:00 PM Maricel García [facility]   10/28/2020  7:45 AM TIMO Agrawal

## 2020-10-21 ENCOUNTER — HOSPITAL ENCOUNTER (OUTPATIENT)
Dept: PHYSICAL THERAPY | Age: 40
Discharge: HOME | End: 2020-10-21
Payer: COMMERCIAL

## 2020-10-21 PROCEDURE — 97110 THERAPEUTIC EXERCISES: CPT

## 2020-10-21 NOTE — PROGRESS NOTES
[address]  : 1980  Payor: Marifer Mir S / Plan: 2900 [facility] [address] [facility] [address] / Product Type: Commerical /  [address] at [address]. [address] [facility] [place], [facility], [address] [place] [address]  [phone]   [phone]                                                          Marco A Cruz MD      OUTPATIENT PHYSICAL THERAPY: Daily Treatment Note 10/21/2020 Visit Count:  8    Tx Diagnosis:  Pain in Left Shoulder (M25.512)   Lateral epicondylitis, left elbow (M77.12)        Pre-treatment Symptoms/Complaints:  Pt reports that her forearm is feeling better. Pain: Initial:0/10 Post Session: 5/10   Medications Last Reviewed:  10/21/2020     Updated Objective Findings: See Initial Eval for more details. TREATMENT:   THERAPEUTIC EXERCISE: (45 minutes):  Exercises per grid below to improve mobility, strength and balance. Required minimal visual, verbal and manual cues to promote proper body alignment and promote proper body posture. Progressed resistance and complexity of movement as indicated. Date:  2020 Date  10/5/2020 Date  10/9/2020 Date  10/14/2020 Date  10/19/2020 Date  10/21/2020   Activity/Exercise Parameters        Education     edcuation on palliative care. Diff DX and improving pain.     Wrist isometrics 10 seconds 10xs    4 min    No money 20xs grn        rows         UBE for warm up 6 min  6 min 6 min 4 min    Trp DN education         Lat pull downs 27# 3x10 30 3x10 33lbs 3x10 37 3x8  40lbs 3x10   rows  23 3x10 27 3x10 30lbs 3x10 33 3x8  33 3x`10   Butterfly stroke 2x10         Velásquez carry 10 lbs 4x 150 ft 10 lbs 4xs 150ft 10 lbs 4xs 150ft 15xs 4 laps 150ft  15xs 4 laps 150ft   Wrist extension    grn band 3x10     Hammer curls  5lbs 3x10 5lbs 3x10      Edy twist   Blue band 30xs   Blue band 30xs   Manual resistance   Eccentric wrist extension 3 x8 Eccentric wrist extension 3 x8     Shoulder ER at 90   grn ban 3x 8 grn ban 3x 8  grn ban 3x 8   pec stretch   3 min      ER isometric   5 seconds 10xs      HSR    7 lbs 3x10  7 lbs 3x10         THERAPEUTIC ACTIVITY: ( 0 minutes): Activities per gid below to improve functional movement related mobility, strength and balance to improve neuro-muscular carryover to daily functional activities for improving patient's quality of life. Required visual, verbal and manual cues to promote proper body alignment and promote proper body posture/mechanics. Progressed resistance and complexity of movement as indicated. Date:  10/21/2020 Date:   Date:     Activity/Exercise Parameters Parameters Parameters                                                                               MANUAL THERAPY: (0 minutes): Joint mobilization, Soft tissue mobilization was utilized and necessary because of the patient's restricted joint motion and restricted motion of soft tissue mobility. Date  10/21/2020    Technique Used Grade Level # Time(s) Effect while being performed                                                        ICE pack 10 min           HEP Log Date 1.    10/21/2020   2.  10/21/2020   3. 10/21/2020   4.    5.           [facility]  Treatment/Session Summary:    Response to Treatment: Pt continued with strengthening and able to imporve to her last weight for heavy slow resistance. MIld pain with exercises that imporved with rest.   Communication/Consultation:  POC, HEP, PT goals, Faxed initial evaluation to MD.   Equipment provided today: HEP Handout     Recommendations/Intent for next treatment session:   Next visit will focus on Manual Therapy Core Stability Dry Needling Traction RTC strengthening soft tissue mobilization. Treatment Plan of Care Effective Dates: 9/16/2020 TO 11/16/2020 (60 days).   Frequency/Duration: 2 times a week for 60 Days             Total Treatment Billable Duration: 47 mins  PT Patient Time In/Time Out  Time In: 1615  Time Out: [address] [facility] Silvana Dykes, PT    Future Appointments   Date Time Provider Heidy Hooper   10/28/2020  7:45 AM Uriah Adams NP [facility]   11/2/2020  4:00 PM Raúl LUCAS [facility]   11/4/2020  4:00 PM Rylan Shaw [facility]

## 2020-11-02 ENCOUNTER — HOSPITAL ENCOUNTER (OUTPATIENT)
Dept: PHYSICAL THERAPY | Age: 40
Discharge: HOME | End: 2020-11-02
Payer: COMMERCIAL

## 2020-11-02 PROCEDURE — 97110 THERAPEUTIC EXERCISES: CPT

## 2020-11-02 NOTE — PROGRESS NOTES
[address]  : 1980  Payor: Marifer Mir S / Plan: 2900 [facility] 30 [facility] [address] / Product Type: Commerical /  [address]. [address] 7500 [facility], [facility], [address] [place] [address]  [phone]   [phone]                                                          Peewee Chavis MD      OUTPATIENT PHYSICAL THERAPY: Daily Treatment Note 2020 Visit Count:  9    Tx Diagnosis:  Pain in Left Shoulder (M25.512)   Lateral epicondylitis, left elbow (M77.12)        Pre-treatment Symptoms/Complaints:  Pt reports that her shoulder and arm are bothering her even after her PRP injection. Pain: Initial:0/10 Post Session: 5/10   Medications Last Reviewed:  2020     Updated Objective Findings: See Initial Eval for more details. TREATMENT:   THERAPEUTIC EXERCISE: (45 minutes):  Exercises per grid below to improve mobility, strength and balance. Required minimal visual, verbal and manual cues to promote proper body alignment and promote proper body posture. Progressed resistance and complexity of movement as indicated. Date  10/5/2020 Date  10/9/2020 Date  10/14/2020 Date  10/19/2020 Date  10/21/2020 Date  2020   Activity/Exercise         Education    edcuation on palliative care. Diff DX and improving pain.      Wrist isometrics    4 min     No money      grn band 5seconds 10 times   rows         UBE for warm up  6 min 6 min 4 min     Trp DN education         Lat pull downs 30 3x10 33lbs 3x10 37 3x8  40lbs 3x10 37lbs 3x10   rows 27 3x10 30lbs 3x10 33 3x8  33 3x`10 30 3x10   Butterfly stroke         Velásquez carry 10 lbs 4xs 150ft 10 lbs 4xs 150ft 15xs 4 laps 150ft  15xs 4 laps 150ft 15xs 4 laps 150ft   Wrist extension   grn band 3x10      Hammer curls 5lbs 3x10 5lbs 3x10       Edy twist  Blue band 30xs   Blue band 30xs    Manual resistance  Eccentric wrist extension 3 x8 Eccentric wrist extension 3 x8      Shoulder ER at 80  grn ban 3x 8 grn ban 3x 8  grn ban 3x 8    pec stretch  3 min       ER isometric  5 seconds 10xs       HSR   7 lbs 3x10  7 lbs 3x10    Wall slides      15xs with breathing   Thoracic extension      15xs over foam roll                           THERAPEUTIC ACTIVITY: ( 0 minutes): Activities per gid below to improve functional movement related mobility, strength and balance to improve neuro-muscular carryover to daily functional activities for improving patient's quality of life. Required visual, verbal and manual cues to promote proper body alignment and promote proper body posture/mechanics. Progressed resistance and complexity of movement as indicated. Date:  11/2/2020 Date:   Date:     Activity/Exercise Parameters Parameters Parameters                                                                               MANUAL THERAPY: (0 minutes): Joint mobilization, Soft tissue mobilization was utilized and necessary because of the patient's restricted joint motion and restricted motion of soft tissue mobility. Date  11/2/2020    Technique Used Grade Level # Time(s) Effect while being performed                                                        ICE pack 10 min           HEP Log Date 1.    11/2/2020   2.  11/2/2020   3. 11/2/2020   4.    5.           [employer] Portal  Treatment/Session Summary:    Response to Treatment: Pt continued with strengthenign but focused on proximal and shoulder issues. Pt has + painful arc and trouble with shoulder flexion   Communication/Consultation:  POC, HEP, PT goals, Faxed initial evaluation to MD.   Equipment provided today: HEP Handout     Recommendations/Intent for next treatment session:   Next visit will focus on Manual Therapy Core Stability Dry Needling Traction RTC strengthening soft tissue mobilization. Treatment Plan of Care Effective Dates: 9/16/2020 TO 11/16/2020 (60 days).   Frequency/Duration: 2 times a week for 60 Days             Total Treatment Billable Duration: 45 mins   TIME IN: [address]  TIME OUT: [address] Rebecca Wells, PT    Future Appointments   Date Time Provider Heidy Hooper   11/4/2020  4:00 PM Edward Whitmore [facility] AND [facility]

## 2020-11-04 ENCOUNTER — HOSPITAL ENCOUNTER (OUTPATIENT)
Dept: PHYSICAL THERAPY | Age: 40
Discharge: HOME | End: 2020-11-04
Payer: COMMERCIAL

## 2020-11-04 PROCEDURE — 97110 THERAPEUTIC EXERCISES: CPT

## 2020-11-04 PROCEDURE — 97140 MANUAL THERAPY 1/> REGIONS: CPT

## 2020-11-04 NOTE — PROGRESS NOTES
[address]  : 1980  Payor: Marifer Mir S / Plan: 2900 [facility] [address] [facility] [address] / Product Type: Commerical /  [address]. [address] 7500 [facility], [facility], [address] [place] [address]  [phone]   [phone]                                                          Brando Hoffman MD      OUTPATIENT PHYSICAL THERAPY: Daily Treatment Note 2020 Visit Count:  10    Tx Diagnosis:  Pain in Left Shoulder (M25.512)   Lateral epicondylitis, left elbow (M77.12)        Pre-treatment Symptoms/Complaints:  Please See Re-Certification note dated 20 for more details. Pain: Initial:010 Post Session: 5/10   Medications Last Reviewed:  2020     Updated Objective Findings: See Initial Eval for more details. TREATMENT:   THERAPEUTIC EXERCISE: (15 minutes):  Exercises per grid below to improve mobility, strength and balance. Required minimal visual, verbal and manual cues to promote proper body alignment and promote proper body posture. Progressed resistance and complexity of movement as indicated. Date  10/5/2020 Date  10/9/2020 Date  10/14/2020 Date  10/19/2020 Date  10/21/2020 Date  2020 Date  20   Activity/Exercise          Education    edcuation on palliative care. Diff DX and improving pain.    Re-assessment   Wrist isometrics    4 min      No money      grn band 5seconds 10 times    rows          UBE for warm up  6 min 6 min 4 min      Trp DN education          Lat pull downs 30 3x10 33lbs 3x10 37 3x8  40lbs 3x10 37lbs 3x10    rows 27 3x10 30lbs 3x10 33 3x8  33 3x`10 30 3x10    Butterfly stroke          Velásquez carry 10 lbs 4xs 150ft 10 lbs 4xs 150ft 15xs 4 laps 150ft  15xs 4 laps 150ft 15xs 4 laps 150ft    Wrist extension   grn band 3x10       Hammer curls 5lbs 3x10 5lbs 3x10        Edy twist  Blue band 30xs   Blue band 30xs     Manual resistance  Eccentric wrist extension 3 x8 Eccentric wrist extension 3 x8       Shoulder ER at 90  grn ban 3x 8 grn ban 3x 8  grn ban 3x 8     pec stretch  3 min        ER isometric  5 seconds 10xs        HSR   7 lbs 3x10  7 lbs 3x10     Wall slides      15xs with breathing    Thoracic extension      15xs over foam roll                              THERAPEUTIC ACTIVITY: ( 0 minutes): Activities per gid below to improve functional movement related mobility, strength and balance to improve neuro-muscular carryover to daily functional activities for improving patient's quality of life. Required visual, verbal and manual cues to promote proper body alignment and promote proper body posture/mechanics. Progressed resistance and complexity of movement as indicated. Date:  11/5/2020 Date:   Date:     Activity/Exercise Parameters Parameters Parameters                                                                               MANUAL THERAPY: (40 minutes): Joint mobilization, Soft tissue mobilization was utilized and necessary because of the patient's restricted joint motion and restricted motion of soft tissue mobility. Date  11/4/2020    Technique Used Grade Level # Time(s) Effect while being performed   Trigger point dry needing and palpation  infraspinatus and biceps mm 40 TDN performed with verbal and written consent. Patient understands procedure and verbalizes agreement. No adverse side effects noted following TDN. Increase in shoulder AROM. Dry Needles Used: 6   Dry Needles Removed: 6                HEP Log Date 1.    11/5/2020   2.  11/5/2020   3. 11/5/2020   4.    5.           [employer] Portal  Treatment/Session Summary:    Response to Treatment: Please See Progress Note dated 11/4/20 for more details. Pt responded very well to trigger point dry needling with no adverse symptoms.  Increase in shoulder AROM to 120 prior to pain onset post manual interventions   Communication/Consultation:  POC, HEP, PT goals, Faxed initial evaluation to MD.   Equipment provided today: HEP Handout     Recommendations/Intent for next treatment session:   Next visit will focus on Manual Therapy Core Stability Dry Needling Traction RTC strengthening soft tissue mobilization.            Treatment Plan of Care Effective Dates: 11/4/20 to 1/3/2021    Frequency/Duration: 2 times a week for 60 Days             Total Treatment Billable Duration: 55 mins  PT Patient Time In/Time Out  Time In: 6412  Time Out: [address]    Future Appointments   Date Time Provider Heidy Hooper   11/11/2020  5:00 PM Jn Castaneda, RT [facility] AND [facility]   11/18/2020  3:15 PM Arnav Currna, PT [facility]   2/1/2021  8:00 AM [facility] VD [facility]

## 2020-11-04 NOTE — THERAPY RECERTIFICATION
[address]  : 1980      Payor: Marifer Twila Mir S / Plan: 2900 [address] / Product Type: [place] /    [address] [place] [address] at [address] [place] [address]. [address], [address] [place] [address]  [phone]   [phone]              OUTPATIENT PHYSICAL THERAPY:Re-evaluation, Progress Report and Recertification:     ICD-10: Treatment Diagnosis:   Pain in Left Shoulder (M25.512)   Lateral epicondylitis, left elbow (M77.12)              Precautions/Allergies:   Codeine and Sulfa (sulfonamide antibiotics)   Fall Risk Score: 0 (? 5 = High Risk)  MD Orders: Eval and Treat  MEDICAL/REFERRING DIAGNOSIS:  Pain in left shoulder [M25.512]  Lesion of radial nerve, left upper limb [G56.32]   DATE OF ONSET: 6 months  REFERRING PHYSICIAN: Dennie Redhead, MD  RETURN PHYSICIAN APPOINTMENT: TBD by patient      Re-evaluation/Re-certification:  [address] has attended 10 PT sessions including eval. She has made improvements in elbow function, however since then has developed a painful arch with her L shoulder AROM, decrease in strength and increase pain that continues to lack her ability to perform functional ADLs such as bathing, dressing and sleeping. Patient will continue to benefit from skilled physical therapy for manual therapeutic techniques (as appropriate), therapeutic exercises and activities, neuromuscular re-education, and comprehensive home exercises program to address current impairments and functional limitations. INITIAL ASSESSMENT:   Ms. Kate Dao presents to physical therapy with decreased strength, ROM, joint mobility, functional mobility. These S/S are consistent with laterl epicondylitis.  Patient will benefit from skilled physical therapy for manual therapeutic techniques (as appropriate), therapeutic exercises and activities, neuromuscular re-education, and comprehensive home exercises program to address current impairments and functional limitations. Dianna Blas will benefit from skilled PT (medically necessary) in order to address above deficits affecting participation in basic ADLs and overall functional tolerance. PROBLEM LIST (Impacting functional limitations):  · Decreased Strength  · Decreased ADL/Functional Activities  · Increased Pain  · Decreased Activity Tolerance  · Increased Shortness of Breath  · Decreased Flexibility/Joint Mobility  · Decreased [place] with Home Exercise Program INTERVENTIONS PLANNED:  1. Cold  2. Family Education  3. Home Exercise Program (HEP)  4. Manual Therapy (trigger point dry needling)  5. Neuromuscular Re-education/Strengthening  6. Range of Motion (ROM)  7. Therapeutic Activites  8. Therapeutic Exercise/Strengthening  9. Transfer Training  10. Ultrasound   TREATMENT PLAN:  Effective Dates: 11/4/20 to 1/3/2021 (60 days). Frequency/Duration: 2 times a week for 60 Days  GOALS: (Goals have been discussed and agreed upon with patient.)     Short-Term Goals~4 weeks  Goal Met   1. Dianna Blas will report <=2/10 pain with don/doffing clothing as well as minimal/no difficulty. 1.  [] Date:   2. Dianna Blas will demonstrate improvement in active wrisyt extension to >90 degrees to increase UE function and participation in ADLs. 2.  [] Date:   3. Dianna Blas will demonstrate demonstrate improvement in active elbow flexion to >140 degrees to increase UE function and participation in ADLs. 3.  [] Date:   4. Dianna Blas will show a greater than 8 point decrease on the DASH in order to show an increase in upper extremity function. 4.  [] Date:   5. Dianna Blas will be independent in all HEP 5.  [] Date:   6.  6.  [] Date:         Long Term Goals~8 weeks Goal Met   1. Dianna Blas will show full ROM of the UE in order to return to full functional mobility  1. [] Date:   2.  Dianna Blas will show a greater than 15 point decrease on the DASH in order to show an increase in upper extremity function 2. [] Date:   3. Alexandria Mortensen will report doing hair/bathing without difficulty and <=2/10 pain in order to be independent with ADL's 3.  [] Date:   4. Alexandria Mortensen will be independent in all advanced HEP 4.  [] Date:            Outcome Measure: Tool Used: Disabilities of the Arm, Shoulder and Hand (DASH) Questionnaire - Quick Version    Score:  Initial: 29.5% Most Recent: 28/55 (Date: 11/4/20)   Interpretation of Score: The DASH is designed to measure the activities of daily living in person's with upper extremity dysfunction or pain. Each section is scored on a 1-5 scale, 5 representing the greatest disability. The scores of each section are added together for a total score of 55. Medical Necessity:   · Skilled intervention continues to be required due to deficits and impairments seen upon initial evaluation affecting patient's participation in ADLs and functional tasks. Reason for Services/Other Comments:  · Patient continues to require skilled intervention due to deficits and impairments seen upon initial evaluation affecting patient's participation in ADLs and functional tasks. Total Treatment Duration:55 mins  PT Patient Time In/Time Out  Time In: 6818  Time Out: 1700    Rehabilitation Potential For Stated Goals: good  Regarding Bartolome Woods's therapy, I certify that the treatment plan above will be carried out by a therapist or under their direction. Thank you for this referral,  Bismark Hopson     Referring Physician Signature: David Lechuga MD              Date                    HISTORY:   History of Present Injury/Illness (Reason for Referral):  Pt reports that for the past 6 months her forearm has been bothering her for some time and has now progressed with ADL's and work related functions.  Pt did receive and injection with helped for a week but is getting frustrated with her progressed. -Present symptoms/complaints (on day of evaluation)  Pain Scale:  · Current: 4/10  · Best: 2/10  · Worst: 7/10    · Aggravating factors: Lifting, Carrying, Placing cup on top shelf, Overhead activities, Reaching for wallet, carrying a child, gripping and throwing  · Relieving factors: rest by side  · Irritability: High (onset of Pain is shorter than alleviation of Pain)    Dominant Side:  right  Past Medical History/Comorbidities:   Ms. Mervat Gore  has a past medical history of Abnormal Papanicolaou smear of cervix, Depression, and HSV (herpes simplex virus) infection. She also has no past medical history of GERD (gastroesophageal reflux disease). Ms. Mervat Gore  has a past surgical history that includes hx wisdom teeth extraction (about 1999); hx cholecystectomy; and hx gyn (2009). Social History/Living Environment:      Prior Level of Function/Work/Activity:  Normal  Other Clinical Tests:         X-RAY Negative for RTC  Current Medications:    Current Outpatient Medications:     b complex vitamins tablet, Take 1 Tab by mouth daily. , Disp: , Rfl:     ferrous sulfate ER (Slow Release Iron) 160 mg (50 mg iron) TbER tablet, Take 1 Tab by mouth daily. , Disp: , Rfl:     clindamycin (CLINDAGEL) 1 % topical gel, Apply  to affected area two (2) times a day. use thin film on affected area, Disp: 60 g, Rfl: 5    buPROPion (WELLBUTRIN) 75 mg tablet, Take 1 Tab by mouth two (2) times a day., Disp: 60 Tab, Rfl: 0    multivitamin (ONE A DAY) tablet, Take 1 Tab by mouth daily. , Disp: , Rfl:     buPROPion XL (WELLBUTRIN XL) 150 mg tablet, Take 1 Tab by mouth every morning., Disp: 30 Tab, Rfl: 11    levonorgestrel (MIRENA) 20 mcg/24 hr (5 years) IUD, 1 Each by IntraUTERine route once., Disp: , Rfl:         Ambulatory/Rehab Services H2 Model Falls Risk Assessment    Risk Factors:       No Risk Factors Identified Ability to Rise from Chair:       (0)  Ability to rise in a single movement    Falls Prevention Plan:       No modifications necessary   Total: (5 or greater = High Risk): 0    ©2010 [facility] [employer]. All Rights Reserved. [facility] States Patent #[id number]. Federal Law prohibits the replication, distribution or use without written permission from [facility] [employer]       Date Last Reviewed:  11/5/2020   Number of Personal Factors/Comorbidities that affect the Plan of Care: 1-2: MODERATE COMPLEXITY   EXAMINATION:   Observation/Orthostatic Postural Assessment:     Forward Head and Rounded Shoulders  Palpation:          Increased tenderness along infraspinatus, lat delt, biceps mm  ROM:    AROM/PROM         Joint: Eval Date: 9/16/2020  Re-Assess Date:11/4/20  Re-Assess Date:    ACTIVE ROM (standing) RIGHT LEFT RIGHT LEFT RIGHT LEFT   Shoulder Flexion     156       Shoulder Abduction      painful arch-able to get full motion       Shoulder Internal Rotation (Apley's)     T8       Shoulder External Rotation (Apley's)     Spine of scapula       Elbow ROM             PASSIVE ROM (supine)             Shoulder Flexion             Shoulder Abduction             Shoulder Internal Rotation       Renée Arch  Renée Arch    Shoulder External Rotation                                                      Strength:    Joint: Eval Date: 9/16/2020  Re-Assess Date:11/4/20  Re-Assess Date:     RIGHT LEFT RIGHT LEFT RIGHT LEFT   Shoulder Flexion 4+/5 4/5    3+/5       Shoulder Abduction  (C5) 4+/5 4+/5    3+/5       Shoulder Internal Rotation 4+/5 4+/5    4+/5       Shoulder External Rotation 4+/5 4+/5    4/5       Elbow Flexion  (C6) 4+/5 4+/5    5/5       Elbow Extension (C7) 5/5 5/5    5/5       Wrist Flexion (C7) 5/5 5/5           Wrist Extension (C6) 4+/5 4+/5           Resisted Thumb Extension/Finger Abduction (C8/T1)             Resisted Cervical Rotation (C1):             Resisted Shoulder Shrug (C2, 3, 4):            Strength 42 lbs 50 lbs                                                      Manual:  Joint Directon Grade Treatment Effect   Radial HEad PA II and III Improved Symptoms post treatment                   Special Tests:     Yanira: Negative   Neer's: Positive   Painful Arc   Td's: Negative    Neurological Screen: Assessed @ Initial Visit    Radiating symptoms? Yes/No=poorly localized  Functional Mobility:  Assessed @ Initial Visit         Body Structures Involved:  1. Bones  2. Joints  3. Muscles  4. Ligaments Body Functions Affected:  1. Sensory/Pain  2. Neuromusculoskeletal  3. Movement Related Activities and Participation Affected:  1. Mobility  2.  Self Care

## 2020-11-11 ENCOUNTER — HOSPITAL ENCOUNTER (OUTPATIENT)
Dept: PHYSICAL THERAPY | Age: 40
Discharge: HOME | End: 2020-11-11
Payer: COMMERCIAL

## 2020-11-11 PROCEDURE — 97110 THERAPEUTIC EXERCISES: CPT

## 2020-11-11 NOTE — PROGRESS NOTES
[address]  : 1980  Payor: 150Atiya Mir S / Plan: 2900 [facility] [address] / Product Type: Commerical /  [address] Sharda Ray., [address] [facility], [facility], [address] [place] [address]  [phone]   [phone]                                                          Elizabeth Diaz MD      OUTPATIENT PHYSICAL THERAPY: Daily Treatment Note 2020 Visit Count:  11    Tx Diagnosis:  Pain in Left Shoulder (M25.512)   Lateral epicondylitis, left elbow (M77.12)        Pre-treatment Symptoms/Complaints:  Pain left shoulder 3/10   Pain: Initial:3/10 Post Session: 5/10   Medications Last Reviewed:  2020     Updated Objective Findings: painful arc left shoulder, but shoulder flexion to 180. TREATMENT:   THERAPEUTIC EXERCISE: (40 minutes):  Exercises per grid below to improve mobility, strength and balance. Required minimal visual, verbal and manual cues to promote proper body alignment and promote proper body posture. Progressed resistance and complexity of movement as indicated. Date  10/5/2020 Date  10/9/2020 Date  10/14/2020 Date  10/19/2020 Date  10/21/2020 Date  2020 Date  20 Date   20    Activity/Exercise           Education    edcuation on palliative care. Diff DX and improving pain.    Re-assessment    Wrist isometrics    4 min       No money      grn band 5seconds 10 times     rows           UBE for warm up  6 min 6 min 4 min       Trp DN education           Lat pull downs 30 3x10 33lbs 3x10 37 3x8  40lbs 3x10 37lbs 3x10  40# 3 x 10   rows 27 3x10 30lbs 3x10 33 3x8  33 3x`10 30 3x10  33 # 3 x 10    Butterfly stroke           Velásquez carry 10 lbs 4xs 150ft 10 lbs 4xs 150ft 15xs 4 laps 150ft  15xs 4 laps 150ft 15xs 4 laps 150ft  15# 150' x 4reverse    Wrist extension   grn band 3x10        Hammer curls 5lbs 3x10 5lbs 3x10         Edy twist  Blue band 30xs   Blue band 30xs      Manual resistance Eccentric wrist extension 3 x8 Eccentric wrist extension 3 x8        Shoulder ER at 90  grn ban 3x 8 grn ban 3x 8  grn ban 3x 8   3 x 10 green   pec stretch  3 min         ER isometric  5 seconds 10xs      With heavy blue band 30 sec hold 15 sec relax 5 x bilateral   HSR   7 lbs 3x10  7 lbs 3x10      Wall slides      15xs with breathing     Thoracic extension      15xs over foam roll     Overhead press         Warm up dowel  10 x   7# 3 x 10 pain 4/10              Reverse body weight rows        3 x 10                    THERAPEUTIC ACTIVITY: ( 0 minutes): Activities per gid below to improve functional movement related mobility, strength and balance to improve neuro-muscular carryover to daily functional activities for improving patient's quality of life. Required visual, verbal and manual cues to promote proper body alignment and promote proper body posture/mechanics. Progressed resistance and complexity of movement as indicated. Date:  11/11/2020 Date:   Date:     Activity/Exercise Parameters Parameters Parameters                                                                               MANUAL THERAPY: (40 minutes): Joint mobilization, Soft tissue mobilization was utilized and necessary because of the patient's restricted joint motion and restricted motion of soft tissue mobility. Date  11/4/2020    Technique Used Grade Level # Time(s) Effect while being performed   Trigger point dry needing and palpation  infraspinatus and biceps mm 40 TDN performed with verbal and written consent. Patient understands procedure and verbalizes agreement. No adverse side effects noted following TDN. Increase in shoulder AROM.                                                             HEP Log Date 1.    11/11/2020   2.  11/11/2020   3. 11/11/2020   4.    5.           [employer] Portal  Treatment/Session Summary:    Response to Treatment: Pain 3/10 increased to 4/10 with overhead press and Communication/Consultation:  Safe pain parameters, need for tendon loading   Equipment provided today: none     Recommendations/Intent for next treatment session:   Next visit will focus on Manual Therapy Core Stability Dry Needling Traction RTC strengthening soft tissue mobilization.            Treatment Plan of Care Effective Dates: 11/4/20 to 1/3/2021    Frequency/Duration: 2 times a week for 60 Days             Total Treatment Billable Duration:40 mins  PT Patient Time In/Time Out  Time In: 1700  Time Out: [address], RT    Future Appointments   Date Time Provider Heidy Hooper   11/18/2020  3:15 PM Osito Childs PT [facility]   2/1/2021  8:00 AM UPS LAB VD UPSG UPSG

## 2020-11-12 NOTE — PROGRESS NOTES
[address]  : 1980  [facility]  Secondary:  [facility]anya Diaz [facility]  [facility]. [facility], [address]  [phone]   [phone]        I am accessing Ms. Darwin Woods's chart as a part of our department's internal chart auditing process. I certify that Ms. Cassondra Dubin is, or was, a patient in our department.   Thank you,  Ofe Mcfarland, PT  2020

## 2020-11-18 ENCOUNTER — APPOINTMENT (OUTPATIENT)
Dept: PHYSICAL THERAPY | Age: 40
End: 2020-11-18
Payer: COMMERCIAL

## 2020-11-19 ENCOUNTER — HOSPITAL ENCOUNTER (OUTPATIENT)
Dept: PHYSICAL THERAPY | Age: 40
Discharge: HOME | End: 2020-11-19
Payer: COMMERCIAL

## 2020-11-19 PROCEDURE — 97110 THERAPEUTIC EXERCISES: CPT

## 2020-11-19 NOTE — THERAPY DISCHARGE
[address]  : 1980      Payor: Marifer Mir S / Plan: 29[address] / Product Type: Commerical /    Yeni Deist at [address] [facility]. Nam Bullard., [address], [place], [id number] [place] [address] Road  [phone]   [phone]              OUTPATIENT PHYSICAL THERAPY:Discharge: 2020    ICD-10: Treatment Diagnosis:   Pain in Left Shoulder (M25.512)   Lateral epicondylitis, left elbow (M77.12)              Precautions/Allergies:   Codeine and Sulfa (sulfonamide antibiotics)   Fall Risk Score: 0 (? 5 = High Risk)  MD Orders: Eval and Treat  MEDICAL/REFERRING DIAGNOSIS:  Pain in left shoulder [M25.512]  Lesion of radial nerve, left upper limb [G56.32]   DATE OF ONSET: 6 months  REFERRING PHYSICIAN: Domingo Escobedo MD  RETURN PHYSICIAN APPOINTMENT: TBD by patient       ·  1. TREATMENT PLAN:       Short-Term Goals~4 weeks  Goal Met   1.  [address] will report <=2/10 pain with don/doffing clothing as well as minimal/no difficulty. 1.  [] Date:   [address] will demonstrate improvement in active wrisyt extension to >90 degrees to increase UE function and participation in ADLs. 2.  [] Date:   [address] will demonstrate demonstrate improvement in active elbow flexion to >140 degrees to increase UE function and participation in ADLs. 3.  [] Date:   [address] will show a greater than 8 point decrease on the DASH in order to show an increase in upper extremity function. 4.  [] Date:   [address] will be independent in all HEP 5.  [] Date:   6.  6.  [] Date:         Long Term Goals~8 weeks Goal Met   1.  [address] will show full ROM of the UE in order to return to full functional mobility  1. [] Date:   [address] will show a greater than 15 point decrease on the DASH in order to show an increase in upper extremity function 2.   [] Date: 2020 [facility] Fritz will report doing hair/bathing without difficulty and <=2/10 pain in order to be independent with ADL's 3.  [] Date:   4. Audrey Rogers will be independent in all advanced HEP 4.  [] Date:            Outcome Measure: Tool Used: Disabilities of the Arm, Shoulder and Hand (DASH) Questionnaire - Quick Version    Score:  Initial: 29.5% Most Recent: X/55 (Date: -- )   Interpretation of Score: The DASH is designed to measure the activities of daily living in person's with upper extremity dysfunction or pain. Each section is scored on a 1-5 scale, 5 representing the greatest disability. The scores of each section are added together for a total score of 55. Medical Necessity:   · Skilled intervention continues to be required due to deficits and impairments seen upon initial evaluation affecting patient's participation in ADLs and functional tasks. Reason for Services/Other Comments:  · Patient continues to require skilled intervention due to deficits and impairments seen upon initial evaluation affecting patient's participation in ADLs and functional tasks. Total Treatment Duration:  PT Patient Time In/Time Out  Time In: 0900  Time Out: 0946    Rehabilitation Potential For Stated Goals: good  Regarding [facility]'s therapy, I certify that the treatment plan above will be carried out by a therapist or under their direction. Thank you for this referral,  Jose Landrum PT     Referring Physician Signature: Claudia Officer, MD              Date                    HISTORY:   History of Present Injury/Illness (Reason for Referral):  Pt reports that for the past 6 months her forearm has been bothering her for some time and has now progressed with ADL's and work related functions. Pt did receive and injection with helped for a week but is getting frustrated with her progressed.       -Present symptoms/complaints (on day of evaluation)  Pain Scale:  · Current: 4/10  · Best: 2/10  · Worst: 7/10    · Aggravating factors: Lifting, Carrying, Placing cup on top shelf, Overhead activities, Reaching for wallet, carrying a child, gripping and throwing  · Relieving factors: rest by side  · Irritability: High (onset of Pain is shorter than alleviation of Pain)    Domina                   Total: (5 or greater = High Risk):            Date Last Reviewed:  11/19/2020   Number of Personal Factors/Comorbidities that affect the Plan of Care: 1-2: MODERATE COMPLEXITY   EXAMINATION:   Observation/Orthostatic Postural Assessment:     Forward Head and Rounded Shoulders  Palpation:          Increased tenderness   ROM:    AROM/PROM         Joint: Eval Date: 9/16/2020  Re-Assess Date:  Re-Assess Date:    ACTIVE ROM (standing) RIGHT LEFT RIGHT LEFT RIGHT LEFT   Shoulder Flexion             Shoulder Abduction             Shoulder Internal Rotation (Apley's)             Shoulder External Rotation (Apley's)             Elbow ROM             PASSIVE ROM (supine)             Shoulder Flexion             Shoulder Abduction             Shoulder Internal Rotation       [address]      Shoulder External Rotation                                                      Strength:    Joint: Eval Date: 9/16/2020  Re-Assess Date:  Re-Assess Date:     RIGHT LEFT RIGHT LEFT RIGHT LEFT   Shoulder Flexion 4+/5 4/5           Shoulder Abduction  (C5) 4+/5 4+/5           Shoulder Internal Rotation 4+/5 4+/5           Shoulder External Rotation 4+/5 4+/5           Elbow Flexion  (C6) 4+/5 4+/5           Elbow Extension (C7) 5/5 5/5           Wrist Flexion (C7) 5/5 5/5           Wrist Extension (C6) 4+/5 4+/5           Resisted Thumb Extension/Finger Abduction (C8/T1)             Resisted Cervical Rotation (C1):             Resisted Shoulder Shrug (C2, 3, 4):               Strength 42 lbs 50 lbs                                                     Manual:  Joint Directon Grade Treatment Effect   Radial HEad PA II and III Improved Symptoms post treatment                   Special Tests:     Neer's: Negative   Painful Arc    Neurological Screen: Assessed @ Initial Visit    Radiating symptoms? Yes/No=poorly localized  Functional Mobility:  Assessed @ Initial Visit         1.  1.  1.                           See associated treatment note for treatment provided today.     Future Appointments   Date Time Provider Heidy Hooper   2/1/2021  8:00 AM [facility] LAB VD UPSG UPSG         Jennifer Mckeon PT

## 2020-11-19 NOTE — PROGRESS NOTES
[address]  : 1980  Payor: Marifer Mir S / Plan: 2900 [address] [facility] / Product Type: Vaerical /  [address] [place] [address] at [address] [place] [address]. [address] [facility] [address], [facility], [address] [place] [address]  [phone]   [phone]                                                          Brando Hoffman MD      OUTPATIENT PHYSICAL THERAPY: Daily Treatment Note 2020 Visit Count:  12    Tx Diagnosis:  Pain in Left Shoulder (M25.512)   Lateral epicondylitis, left elbow (M77.12)        Pre-treatment Symptoms/Complaints: Pt reports that her shoulder is trending better but she still is limited with exercises. Pain: Initial:3/10 Post Session: 5/10   Medications Last Reviewed:  2020     Updated Objective Findings: painful arc left shoulder, but shoulder flexion to 180. TREATMENT:   THERAPEUTIC EXERCISE: (40 minutes):  Exercises per grid below to improve mobility, strength and balance. Required minimal visual, verbal and manual cues to promote proper body alignment and promote proper body posture. Progressed resistance and complexity of movement as indicated. Date  10/14/2020 Date  10/19/2020 Date  10/21/2020 Date  2020 Date  20 Date   20  Date  2020   Activity/Exercise          Education  edcuation on palliative care. Diff DX and improving pain.    Re-assessment  Discharge Education, progression through exercises     Wrist isometrics  4 min        No money    grn band 5seconds 10 times      rows          UBE for warm up 6 min 4 min        Trp DN education          Lat pull downs 37 3x8  40lbs 3x10 37lbs 3x10  40# 3 x 10 40 lbs 3x10   rows 33 3x8  33 3x`10 30 3x10  33 # 3 x 10  33 3x10   Butterfly stroke          Velásquez carry 15xs 4 laps 150ft  15xs 4 laps 150ft 15xs 4 laps 150ft  15# 150' x 4reverse  15lbs 4 laps   Wrist extension grn band 3x10         Hammer curls          Edy twist   Blue band 30xs Manual resistance Eccentric wrist extension 3 x8         Shoulder ER at 90 grn ban 3x 8  grn ban 3x 8   3 x 10 green    pec stretch          ER isometric      With heavy blue band 30 sec hold 15 sec relax 5 x bilateral    HSR 7 lbs 3x10  7 lbs 3x10       Wall slides    15xs with breathing      Thoracic extension    15xs over foam roll      Overhead press       Warm up dowel  10 x   7# 3 x 10 pain 4/10 10 lbs 3x10             Reverse body weight rows      3 x 10 3x10                   THERAPEUTIC ACTIVITY: ( 0 minutes): Activities per gid below to improve functional movement related mobility, strength and balance to improve neuro-muscular carryover to daily functional activities for improving patient's quality of life. Required visual, verbal and manual cues to promote proper body alignment and promote proper body posture/mechanics. Progressed resistance and complexity of movement as indicated. Date:  11/19/2020 Date:   Date:     Activity/Exercise Parameters Parameters Parameters                                                                               MANUAL THERAPY: (0 minutes): Joint mobilization, Soft tissue mobilization was utilized and necessary because of the patient's restricted joint motion and restricted motion of soft tissue mobility.         Date  11/4/2020    Technique Used Grade Level # Time(s) Effect while being performed                                                                   HEP Log Date 1.    11/19/2020   2.  11/19/2020   3. 11/19/2020   4.    5.           [employer] Portal  Treatment/Session Summary:    Response to Treatment: Please see discharge summary dated 11.19.20 for more details   Communication/Consultation:  Discharge Summary faxed to MD.   Equipment provided today: HEP handout                          Total Treatment Billable Duration: 40 mins  PT Patient Time In/Time Out  Time In: 0900  Time Out: 58 Gala Mclean, PT    Future Appointments   Date Time Provider Heidy Hooper   2/1/2021  8:00 AM [facility] VD Russell Vick

## 2021-02-01 ENCOUNTER — HOSPITAL ENCOUNTER (OUTPATIENT)
Dept: LAB | Age: 41
Discharge: HOME | End: 2021-02-01
Attending: NURSE PRACTITIONER
Payer: COMMERCIAL

## 2021-02-01 DIAGNOSIS — R79.89 ELEVATED TSH: ICD-10-CM

## 2021-02-01 LAB
T4 FREE SERPL-MCNC: 0.9 NG/DL (ref 0.78–1.46)
TSH SERPL DL<=0.005 MIU/L-ACNC: 3.94 UIU/ML

## 2021-02-01 PROCEDURE — 36415 COLL VENOUS BLD VENIPUNCTURE: CPT

## 2021-02-01 PROCEDURE — 84439 ASSAY OF FREE THYROXINE: CPT

## 2021-02-01 PROCEDURE — 84443 ASSAY THYROID STIM HORMONE: CPT

## 2022-02-21 ENCOUNTER — HOSPITAL ENCOUNTER (OUTPATIENT)
Dept: MAMMOGRAPHY | Age: 42
Discharge: HOME | End: 2022-02-21
Attending: NURSE PRACTITIONER
Payer: COMMERCIAL

## 2022-02-21 DIAGNOSIS — Z01.419 WELL WOMAN EXAM: ICD-10-CM

## 2022-02-21 DIAGNOSIS — Z12.31 SCREENING MAMMOGRAM FOR BREAST CANCER: ICD-10-CM

## 2022-02-21 PROCEDURE — 77063 BREAST TOMOSYNTHESIS BI: CPT

## 2022-11-02 ENCOUNTER — OFFICE VISIT (OUTPATIENT)
Dept: OBGYN CLINIC | Age: 42
End: 2022-11-02
Payer: COMMERCIAL

## 2022-11-02 VITALS
DIASTOLIC BLOOD PRESSURE: 84 MMHG | BODY MASS INDEX: 26.22 KG/M2 | WEIGHT: 148 LBS | SYSTOLIC BLOOD PRESSURE: 124 MMHG | HEIGHT: 63 IN

## 2022-11-02 DIAGNOSIS — L65.9 HAIR LOSS: ICD-10-CM

## 2022-11-02 DIAGNOSIS — R68.89 COLD INTOLERANCE: ICD-10-CM

## 2022-11-02 DIAGNOSIS — Z12.31 ENCOUNTER FOR SCREENING MAMMOGRAM FOR BREAST CANCER: ICD-10-CM

## 2022-11-02 DIAGNOSIS — Z30.431 INTRAUTERINE DEVICE SURVEILLANCE: ICD-10-CM

## 2022-11-02 DIAGNOSIS — R33.9 INCOMPLETE BLADDER EMPTYING: Primary | ICD-10-CM

## 2022-11-02 DIAGNOSIS — Z01.419 ENCNTR FOR GYN EXAM (GENERAL) (ROUTINE) W/O ABN FINDINGS: ICD-10-CM

## 2022-11-02 LAB
BILIRUBIN, URINE, POC: NEGATIVE
BLOOD URINE, POC: NEGATIVE
GLUCOSE URINE, POC: NEGATIVE
KETONES, URINE, POC: NEGATIVE
LEUKOCYTE ESTERASE, URINE, POC: NEGATIVE
NITRITE, URINE, POC: NEGATIVE
PH, URINE, POC: 7.5 (ref 4.6–8)
PROTEIN,URINE, POC: NEGATIVE
SPECIFIC GRAVITY, URINE, POC: 1.02 (ref 1–1.03)
URINALYSIS CLARITY, POC: CLEAR
URINALYSIS COLOR, POC: YELLOW
UROBILINOGEN, POC: NORMAL

## 2022-11-02 PROCEDURE — 99386 PREV VISIT NEW AGE 40-64: CPT | Performed by: OBSTETRICS & GYNECOLOGY

## 2022-11-02 PROCEDURE — 81001 URINALYSIS AUTO W/SCOPE: CPT | Performed by: OBSTETRICS & GYNECOLOGY

## 2022-11-02 RX ORDER — ELECTROLYTES/DEXTROSE
1 SOLUTION, ORAL ORAL DAILY
COMMUNITY

## 2022-11-02 ASSESSMENT — PATIENT HEALTH QUESTIONNAIRE - PHQ9
SUM OF ALL RESPONSES TO PHQ9 QUESTIONS 1 & 2: 0
SUM OF ALL RESPONSES TO PHQ QUESTIONS 1-9: 0
2. FEELING DOWN, DEPRESSED OR HOPELESS: 0
1. LITTLE INTEREST OR PLEASURE IN DOING THINGS: 0
SUM OF ALL RESPONSES TO PHQ QUESTIONS 1-9: 0

## 2022-11-02 NOTE — PROGRESS NOTES
Pt comes in today for AE. LAST PAP:  11/03/2021 Negative     LAST MAMMO:  02/21/2022 Negative     LMP:  No LMP recorded. (Menstrual status: IUD).     BIRTH CONTROL:  IUD    TOBACCO USE:  No    FAMILY HISTORY OF:   Breast Cancer:  Yes   Ovarian Cancer:  Yes   Uterine Cancer:  Yes   Colon Cancer:  No    Vitals:    11/02/22 1451   BP: 124/84   Site: Left Upper Arm   Position: Sitting   Weight: 148 lb (67.1 kg)   Height: 5' 2.5\" (1.588 m)        Jacob Castorena MA  11/02/22  2:58 PM

## 2022-11-02 NOTE — PROGRESS NOTES
[place] [address] [facility], [address], [address]    Roberto Carlos Mckeon MD, Ray Plasencia MD, BRUCE Romero-BC    Assessment/Plan     Alisia Lincoln was seen today for annual exam.    Diagnoses and all orders for this visit:    Encntr for gyn exam (general) (routine) w/o abn findings  Comments:  - pap UTD 2021 neg, HPV neg  - mammogram ordered, due 2023  - looking for new PCP, recommended Dr. Nails Arm  - IUD for contraception    Encounter for screening mammogram for breast cancer  -     LION MANOHAR DIGITAL SCREEN BILATERAL; Future    Hair loss  Comments:  - checking TSH  Orders:  -     TSH with Reflex; Future    Cold intolerance  Comments:  - checking TSH    Intrauterine device surveillance  Comments:  - inserted 17 (expires )  -appears to be in correct position on exam    Incomplete bladder emptying  Comments:  - UA neg, no abnormality on exam, last A1C wnl  - discussed dietary modification, can consider urology if worsens         Subjective     Christine Gum  38 y.o. Shawanda Riley presents today for annual Gyn exam.  Only complaint is hair thinning and cold intolerance. Has a fhx of hypothyroidism. Mirena IUD, has spotting ever 3 months. No pain/bleeding with sex but will notice dark blood 2 a year (rare). No abnormal discharge. H/o alternating constipation/diarrhea but no new changes, no blood in stool. Recently noted inability to empty bladder over the last 3 months. No dysuria, hematuria, nocturia or urgency. UA neg. No new medicines or dietary changes. Last A1C 5.4 10/2020. 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 LAST PAP: 11/3/21 neg, HPV neg    LAST MAMMO: 22 neg    LMP: No LMP recorded. (Menstrual status: IUD).     BIRTH CONTROL: IUD      OB History    Para Term  AB Living   0 0 0 0 0 0   SAB IAB Ectopic Molar Multiple Live Births   0 0 0 0 0 0           Past Medical History:   Diagnosis Date    Abnormal Papanicolaou smear of cervix     Depression     HSV (herpes simplex virus) infection             Past Surgical History:   Procedure Laterality Date    CHOLECYSTECTOMY      GYN  2009    cryotherapy of the cervix    WISDOM TOOTH EXTRACTION  about 1999    x 4            Family History   Problem Relation Age of Onset    Emergence Delirium Neg Hx     Post-op Cognitive Dysfunction Neg Hx     Cancer Mother         cervical cancer- [facility]? no radiation    Cancer Paternal Grandmother 62        ovarian cancer    Ovarian Cancer Paternal Grandmother     Cancer Other         MGGM- uterine cancer    Alcohol Abuse Father     High Cholesterol Maternal Grandmother     Hypertension Maternal Grandmother     Hypertension Maternal Grandfather     High Cholesterol Maternal Grandfather     Diabetes Paternal Grandfather     Heart Attack Paternal Grandfather     Diabetes Paternal Aunt     Breast Cancer Maternal Aunt 62    [employer] Hypertherm Neg Hx     Pseudochol. Deficiency Neg Hx     Delayed Awakening Neg Hx     Post-op Nausea/Vomiting Neg Hx     Colon Cancer Neg Hx     Other Neg Hx            Social History     Socioeconomic History    Marital status:      Spouse name: Not on file    Number of children: Not on file    Years of education: Not on file    Highest education level: Not on file   Occupational History    Not on file   Tobacco Use    Smoking status: Former     Packs/day: 0.50     Types: Cigarettes    Smokeless tobacco: Never    Tobacco comments:     Quit smoking: Quit 6/2009, will still smoke socially   Substance and Sexual Activity    Alcohol use: Yes     Alcohol/week: 0.0 standard drinks    Drug use: Never    Sexual activity: Yes     Partners: Male     Birth control/protection: Surgical, I.U.D.      Comment: Vasectomy   Other Topics Concern    Not on file   Social History Narrative    Not on file     Social Determinants of Health     Financial Resource Strain: Not on file   Food Insecurity: Not on file   Transportation Needs: Not on file   Physical Activity: Not on file   Stress: Not on file   Social Connections: Not on file   Intimate Partner Violence: Not on file   Housing Stability: Not on file           Allergies   Allergen Reactions    Codeine Itching    Sulfa Antibiotics Other (See Comments)     Told allergic as a child. Review of Systems    Constitutional:  No fevers or chills    Neuro:  No headaches, seizure activity    HEENT: no visual changes, bleeding gums    CV: No chest pain or palpitations    Resp: No SOB or cough    Breast:  No masses, pain, D/C, bleeding    GI:  No nausea/vomiting/diarrhea/constipation    : No dysuria or hematuria    MS:  No back, point pain    Gyn:  No menstrual complaints, pelvic pain    Psych:  No depression, anxiety      Objective       Vitals:    11/02/22 1451   BP: 124/84         Physical Exam    General:  well developed, well nourished, in no acute distress     Head:   normocephalic and atraumatic     Mouth:  no deformity or lesions with good dentition     Neck:  no masses, thyromegaly, or abnormal cervical nodes     Chest Wall:  no deformities     Breasts: breast symetrical w/o masses,skin changes,dimpling,or nipple discharge     Lungs:  clear bilaterally with normal respirations     Heart:   regular rate and rhythm, S1, S2 without murmurs     Abdomen:  bowel sounds positive; abdomen soft and non-tender without masses, organomegaly, or hernias noted     Pelvic Exam:       External: normal female genitalia without lesions or masses       Vagina: normal without lesions or masses       Cervix: normal without lesions or masses. IUD strings 2 cm from os.         Adnexa: normal bimanual exam without masses or fullness       Uterus: normal by palpation       Pap smear:  performed     Msk:   no deformity or scoliosis noted with normal posture and gait     Extremities: no clubbing, cyanosis, edema, or deformity noted with normal full range of motion of all joints     Neurologic:  no focal deficits,normal coordination, muscle strength and tone     Skin:   intact without lesions or rashes     Cervical Nodes:  no significant adenopathy     Axillary Nodes:   no significant adenopathy     Psych:  alert and cooperative; normal mood and affect; normal attention span and concentration      Alejandra Ozuna MD   3:49 PM  11/02/22

## 2022-11-03 LAB — TSH W FREE THYROID IF ABNORMAL: 2.97 UIU/ML (ref 0.36–3.74)

## 2023-01-03 ENCOUNTER — TELEPHONE (OUTPATIENT)
Dept: OBGYN CLINIC | Age: 43
End: 2023-01-03

## 2023-01-03 DIAGNOSIS — M79.622 LEFT AXILLARY PAIN: Primary | ICD-10-CM

## 2023-01-03 NOTE — TELEPHONE ENCOUNTER
Patient LM stating she tried to schedule her mammogram but when they asked her if she has pain she told them yes. She stated she was not having pain at her visit, but has noticed left axilla pain.  She is requesting a diagnostic mammogram.

## 2023-01-03 NOTE — TELEPHONE ENCOUNTER
Patient informed and stated she could not answer her phone when they called. Patient voiced understanding.  estela

## 2023-02-04 ENCOUNTER — TELEPHONE (OUTPATIENT)
Dept: ORTHOPEDIC SURGERY | Age: 43
End: 2023-02-04

## 2023-02-04 RX ORDER — PREDNISONE 20 MG/1
20 TABLET ORAL 2 TIMES DAILY
Qty: 10 TABLET | Refills: 0 | Status: SHIPPED | OUTPATIENT
Start: 2023-02-04 | End: 2023-02-09

## 2023-02-04 RX ORDER — AZITHROMYCIN 250 MG/1
250 TABLET, FILM COATED ORAL SEE ADMIN INSTRUCTIONS
Qty: 6 TABLET | Refills: 0 | Status: SHIPPED | OUTPATIENT
Start: 2023-02-04 | End: 2023-02-09

## 2023-02-20 ENCOUNTER — HOSPITAL ENCOUNTER (OUTPATIENT)
Dept: MAMMOGRAPHY | Age: 43
Discharge: HOME | End: 2023-02-23
Payer: COMMERCIAL

## 2023-02-20 DIAGNOSIS — R52 PAIN: ICD-10-CM

## 2023-02-20 DIAGNOSIS — M79.622 LEFT AXILLARY PAIN: ICD-10-CM

## 2023-02-20 PROCEDURE — G0279 TOMOSYNTHESIS, MAMMO: HCPCS

## 2023-02-20 PROCEDURE — 76642 ULTRASOUND BREAST LIMITED: CPT

## 2023-03-09 ENCOUNTER — OFFICE VISIT (OUTPATIENT)
Dept: ENT CLINIC | Age: 43
End: 2023-03-09
Payer: COMMERCIAL

## 2023-03-09 VITALS
HEIGHT: 63 IN | WEIGHT: 150 LBS | BODY MASS INDEX: 26.58 KG/M2 | SYSTOLIC BLOOD PRESSURE: 110 MMHG | DIASTOLIC BLOOD PRESSURE: 80 MMHG

## 2023-03-09 DIAGNOSIS — K21.9 LARYNGOPHARYNGEAL REFLUX (LPR): ICD-10-CM

## 2023-03-09 DIAGNOSIS — R49.0 HOARSENESS: ICD-10-CM

## 2023-03-09 DIAGNOSIS — J38.3 VOCAL PROCESS GRANULOMA: Primary | ICD-10-CM

## 2023-03-09 DIAGNOSIS — J30.9 ALLERGIC RHINITIS, UNSPECIFIED SEASONALITY, UNSPECIFIED TRIGGER: ICD-10-CM

## 2023-03-09 PROCEDURE — 99214 OFFICE O/P EST MOD 30 MIN: CPT | Performed by: PHYSICIAN ASSISTANT

## 2023-03-09 PROCEDURE — 31575 DIAGNOSTIC LARYNGOSCOPY: CPT | Performed by: PHYSICIAN ASSISTANT

## 2023-03-09 RX ORDER — AZELASTINE HYDROCHLORIDE 137 UG/1
1 SPRAY, METERED NASAL 2 TIMES DAILY
Qty: 1 EACH | Refills: 2 | Status: SHIPPED | OUTPATIENT
Start: 2023-03-09

## 2023-03-09 ASSESSMENT — ENCOUNTER SYMPTOMS
EYE DISCHARGE: 0
VOICE CHANGE: 1
ABDOMINAL PAIN: 0
COUGH: 0

## 2023-03-09 NOTE — PATIENT INSTRUCTIONS
Omeprazole 20 mg in the am  Pepcid 20 mg qhs  Barrier therapy 1 tsp after every meal and before bed. - Reflux Gourmet available on [facility] or refluxgourmet. [employer]   - Silatronix advanced   Astelin (Azelastine) - 2 sprays daily

## 2023-03-09 NOTE — PROGRESS NOTES
Manisha Crook is a 41 y.o. female presents today with c/o hoarseness and postnasal drainage. Her voice has been different and feels more rough and coarse. No dyspnea. She feels like her throat is red and the left side is more sore than the right. She saw Dr. Blayne Still about 2 weeks ago and he gave her Kenalog, while they were at the hospital together. 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 She utilizes Xyzal daily but denies reflux or reflux medication. She is a former vapor and smoker. She has not been scoped. But has been previously seen by Dr. Juwan Torres. He has never been allergy tested, denies dysphagia and weight loss. Chief Complaint   Patient presents with    Other     Patient here for hoarseness and sore throat X1 year. Patient Active Problem List   Diagnosis    Leiomyoma        Reviewed and updated this visit by provider:  Tobacco  Allergies  Meds  Problems  Med Hx  Surg Hx  Fam Hx         Review of Systems   Constitutional:  Negative for fever. HENT:  Positive for voice change. Negative for ear discharge and ear pain. Eyes:  Negative for discharge. Respiratory:  Negative for cough. Gastrointestinal:  Negative for abdominal pain. Genitourinary:  Negative for difficulty urinating. Musculoskeletal:  Negative for neck pain. Skin:  Negative for rash. Allergic/Immunologic: Negative for environmental allergies. Neurological:  Negative for dizziness. Hematological:  Negative for adenopathy. Psychiatric/Behavioral:  Negative for agitation. /80 (Site: Left Upper Arm, Position: Sitting)   Ht 5' 2.5\" (1.588 m)   Wt 150 lb (68 kg)   BMI 27.00 kg/m²     Physical Exam:    General: Well developed, well nourished, in no acute distress  Communication: The patient communicates appropriately for their age. Voice: good strength but mild rough quality .   Head, Face, and Salivary Glands: No head or facial abnormalities present, No masses or lesions present, Overall appearance is normal, No abnormality of parotid or submandibular glands present. TMJ joint: no tenderness to palpation, crepitus, or deviation. External Ears: appearance is normal with no scars, lesions or masses. Right Ear:  Canals is normal, Tympanic membrane with normal landmarks and normal mobility, no retraction, inflammation, effusion. Left  Ear: Canal is normal, Tympanic membrane with normal landmarks and normal mobility, no retraction, inflammation, effusion. Nose/Nasal Cavity: Nasal mucosa is red/ inflamed. Nasal septum is deviated left. Inferior turbinates are Hypertrophic. Both nasal passages are congested and narrow with clear posterior nasal drainage. no polyps or mucopus. Lips/Gums/Teeth: Inspection of lips, gums and teeth are normal  Oral Cavity: normal oral mucosa, no visualized ulcers, masses or other lesions, Palate normal, Tongue normal, Floor of mouth normal. Mallampati Class 2 . Oropharynx: Oropharynx normal and unobstructed, tonsils are  + 1 , no lesion but some streaking and cobblestoning with erythema. Neck/Thyroid: Normal appearance without mass, Trachea midline, No lymphadenopathy, No enlargement, tenderness or mass of thyroid noted. Procedure Flex: Diagnostic Flexible Laryngoscopy     Findings:   Nose: hypertrophic turbinates and normal middle meatus bilaterally    Nasopharynx: normal eustachian tube, lindsey, and fossa of Rosenmueler bilaterally. Cobblestoning     Hypopharynx: normal including the vallecula, piriform sinuses, base of tongue, and postcricoid area. Erythema and edema of the arytenoid cartilage. Glottis and Laryngeal Motor exam:  No evidence of vocal fold weakness. normal abduction, adduction, and stretch. Mucosal granulomatous lesion on the left false cord and vocal process, small one building on the right.      Subglottis and Trachea:  no visible lesions    Indication: Adequate visualization of the larynx with connected speech not possible without endoscopic evaluation. Consent: The patient verbally consented to the recording of their face and upper aerodigestive tract. Anesthesia: Phenylephrine, Lidocaine    Details: A flexible fiberoptic laryngoscope was passed through the most patent nasal cavity into the nasopharynx which was examined. The scope was then passed into the oropharynx. The hypopharynx, base of tongue, vallecula, epiglottis, aryepiglottic folds, arytenoids, false vocal cords, true vocal cords, subglottic area, pyriform sinuses, and the post cricoid area was examined. Assessment/Plan:  1. Vocal process granuloma  -     LARYNGOSCOPY,FLEX FIBER,DIAGNOSTIC  2. Hoarseness  -     LARYNGOSCOPY,FLEX FIBER,DIAGNOSTIC  3. Laryngopharyngeal reflux (LPR)    Pt has been having hoarseness and posterior nasal drainage. Recommend Astelin, maximizing reflux tx with Omeprazoelle Pepcid, and Barrier therapy. Granuloma present on the left larger than a small one on the right vocal process. Recommend speech therapy and return for scope at next visit. May consider allergy testing if drainage does not respond to azelastine. Return in about 4 weeks (around 4/6/2023), or recheck left vocal cord granuloma. Patient agrees with this plan.     CE Acosta

## 2023-04-06 ENCOUNTER — OFFICE VISIT (OUTPATIENT)
Dept: ENT CLINIC | Age: 43
End: 2023-04-06
Payer: COMMERCIAL

## 2023-04-06 VITALS
HEIGHT: 63 IN | DIASTOLIC BLOOD PRESSURE: 70 MMHG | BODY MASS INDEX: 26.75 KG/M2 | SYSTOLIC BLOOD PRESSURE: 100 MMHG | WEIGHT: 151 LBS

## 2023-04-06 DIAGNOSIS — K21.9 LARYNGOPHARYNGEAL REFLUX (LPR): Chronic | ICD-10-CM

## 2023-04-06 DIAGNOSIS — J30.9 ALLERGIC RHINITIS, UNSPECIFIED SEASONALITY, UNSPECIFIED TRIGGER: ICD-10-CM

## 2023-04-06 DIAGNOSIS — R49.0 HOARSENESS: Chronic | ICD-10-CM

## 2023-04-06 DIAGNOSIS — J38.3 VOCAL PROCESS GRANULOMA: Primary | Chronic | ICD-10-CM

## 2023-04-06 PROCEDURE — 99213 OFFICE O/P EST LOW 20 MIN: CPT | Performed by: PHYSICIAN ASSISTANT

## 2023-04-06 ASSESSMENT — ENCOUNTER SYMPTOMS
EYE DISCHARGE: 0
COUGH: 0
ABDOMINAL PAIN: 0

## 2023-04-06 NOTE — PROGRESS NOTES
subglottically quite small. No change to the lesion on the right vocal process appears the same. Scope was performed on the tower. Subglottis and Trachea:  no visible lesions    Indication: Adequate visualization of the larynx with connected speech not possible without endoscopic evaluation. Consent: The patient verbally consented to the recording of their face and upper aerodigestive tract. Anesthesia: Phenylephrine, Lidocaine    Details: A flexible fiberoptic laryngoscope was passed through the most patent nasal cavity into the nasopharynx which was examined. The scope was then passed into the oropharynx. The hypopharynx, base of tongue, vallecula, epiglottis, aryepiglottic folds, arytenoids, false vocal cords, true vocal cords, subglottic area, pyriform sinuses, and the post cricoid area was examined. Assessment/Plan:  1. Vocal process granuloma  2. Hoarseness  3. Laryngopharyngeal reflux (LPR)  4. Allergic rhinitis, unspecified seasonality, unspecified trigger    The patient looks largely unchanged. Still significant edema and erythema of the hypopharynx and would recommend continuation of omeprazole, Pepcid, barrier therapy. She notices a significant improvement with the use of barrier therapy. She will continue speech at their recommendation and will return to clinic in 8 weeks for recheck. If still present and unchanged would recommend biopsy. We can consider allergy testing if she would like. No follow-ups on file. Patient agrees with this plan. CE Medina    This note was generated using voice recognition software, please excuse any typos.

## 2023-06-06 ENCOUNTER — OFFICE VISIT (OUTPATIENT)
Dept: ENT CLINIC | Age: 43
End: 2023-06-06
Payer: COMMERCIAL

## 2023-06-06 VITALS — RESPIRATION RATE: 18 BRPM | WEIGHT: 148 LBS | HEIGHT: 63 IN | OXYGEN SATURATION: 100 % | BODY MASS INDEX: 26.22 KG/M2

## 2023-06-06 DIAGNOSIS — K21.9 LARYNGOPHARYNGEAL REFLUX (LPR): ICD-10-CM

## 2023-06-06 DIAGNOSIS — J38.3 VOCAL PROCESS GRANULOMA: Primary | Chronic | ICD-10-CM

## 2023-06-06 DIAGNOSIS — R49.0 HOARSENESS: Chronic | ICD-10-CM

## 2023-06-06 PROCEDURE — 99214 OFFICE O/P EST MOD 30 MIN: CPT | Performed by: PHYSICIAN ASSISTANT

## 2023-06-06 ASSESSMENT — ENCOUNTER SYMPTOMS
EYE DISCHARGE: 0
SORE THROAT: 1
COUGH: 0
ABDOMINAL PAIN: 0

## 2023-06-06 NOTE — PROGRESS NOTES
submandibular glands present. External Ears: appearance is normal with no scars, lesions or masses. Right Ear:  Canals is normal, Tympanic membrane with normal landmarks and normal mobility, no retraction, inflammation, effusion. Left  Ear: Canal is normal, Tympanic membrane with normal landmarks and normal mobility, no retraction, inflammation, effusion. Nose/Nasal Cavity: Nasal mucosa is pink/ moist.  Nasal septum is midline. Inferior turbinates are of normal size. Both nasal passages are clear, no polyps or abnormal drainage. Lips/Gums/Teeth: Inspection of lips, gums and teeth are normal  Oral Cavity: normal oral mucosa, no visualized ulcers, masses or other lesions,  Palate normal, Tongue normal, Floor of mouth normal. Mallampati Class 1 . Oropharynx: Oropharynx normal and unobstructed, tonsils are  + 1  , no lesion or inflammation. Neck/Thyroid: Normal appearance without mass, Trachea midline, No lymphadenopathy, No enlargement, tenderness or mass of thyroid noted. Procedure: N/A    Assessment/Plan:  1. Vocal process granuloma  2. Hoarseness  3. Laryngopharyngeal reflux (LPR)    Patient continues to have hoarseness despite maximizing LPR management with omeprazole in the morning, Pepcid at bedtime and barrier therapy. She responded well over the last interval did some speech therapy, but relief did not last and several weeks ago she felt the hoarseness returned. She got good improvement with Astelin regarding the postnasal drainage. She would like to consider excisional biopsy and we discussed Dr. Estrella Tong removing lesion and outpatient procedure with possible injection of steroid. We will schedule at a mutually convenient time. Return for per surgery. Patient agrees with this plan. CE Baird    This note was generated using voice recognition software, please excuse any typos.

## 2023-06-19 ENCOUNTER — PREP FOR PROCEDURE (OUTPATIENT)
Dept: ENT CLINIC | Age: 43
End: 2023-06-19

## 2023-06-19 DIAGNOSIS — K21.9 LARYNGOPHARYNGEAL REFLUX (LPR): Primary | ICD-10-CM

## 2023-06-19 PROBLEM — J38.7 LESION OF LARYNX: Status: ACTIVE | Noted: 2023-06-19

## 2023-07-20 DIAGNOSIS — G89.18 POST-OP PAIN: Primary | ICD-10-CM

## 2023-07-21 NOTE — PROGRESS NOTES
Patient verified name and . Order for consent is found in EHR and matches case posting; patient verifies procedure. Type 1b surgery, Phone assessment complete. Orders were received. Labs per surgeon: none  Labs per anesthesia protocol: none    Patient answered medical/surgical history questions at their best of ability. All prior to admission medications documented in EPIC. Patient instructed to take the following medications the day of surgery according to anesthesia guidelines with a small sip of water: omeprazole. Hold all vitamins 7 days prior to surgery and NSAIDS 5 days prior to surgery. Prescription meds to hold:none  Patient instructed on the following:    > Arrive at [address] [place] [address], time of arrival to be called the day before by 1700  > NPO after midnight, unless otherwise indicated, including gum, mints, and ice chips  > Responsible adult must drive patient to the hospital, stay during surgery, and patient will need supervision 24 hours after anesthesia  > Use antibacterial soap in shower the night before surgery and on the morning of surgery  > All piercings must be removed prior to arrival.    > Leave all valuables (money and jewelry) at home but bring insurance card and ID on DOS.   > Do not wear make-up, nail polish, lotions, cologne, perfumes, powders, or oil on skin. Artificial nails are not permitted.

## 2023-07-24 RX ORDER — HYDROCODONE BITARTRATE AND ACETAMINOPHEN 5; 325 MG/1; MG/1
1 TABLET ORAL EVERY 4 HOURS PRN
Qty: 30 TABLET | Refills: 0 | Status: SHIPPED | OUTPATIENT
Start: 2023-07-24 | End: 2023-07-29

## 2023-07-24 RX ORDER — ONDANSETRON 4 MG/1
4 TABLET, ORALLY DISINTEGRATING ORAL 3 TIMES DAILY PRN
Qty: 10 TABLET | Refills: 0 | Status: SHIPPED | OUTPATIENT
Start: 2023-07-24

## 2023-07-24 RX ORDER — CEPHALEXIN 500 MG/1
500 CAPSULE ORAL 4 TIMES DAILY
Qty: 28 CAPSULE | Refills: 0 | Status: ON HOLD | OUTPATIENT
Start: 2023-07-24 | End: 2023-07-26 | Stop reason: HOSPADM

## 2023-07-25 ENCOUNTER — ANESTHESIA EVENT (OUTPATIENT)
Dept: SURGERY | Age: 43
End: 2023-07-25
Payer: COMMERCIAL

## 2023-07-26 ENCOUNTER — ANESTHESIA (OUTPATIENT)
Dept: SURGERY | Age: 43
End: 2023-07-26
Payer: COMMERCIAL

## 2023-07-26 ENCOUNTER — HOSPITAL ENCOUNTER (OUTPATIENT)
Age: 43
Setting detail: OUTPATIENT SURGERY
Discharge: HOME | End: 2023-07-26
Attending: OTOLARYNGOLOGY | Admitting: OTOLARYNGOLOGY
Payer: COMMERCIAL

## 2023-07-26 VITALS
WEIGHT: 141.2 LBS | DIASTOLIC BLOOD PRESSURE: 64 MMHG | HEART RATE: 74 BPM | OXYGEN SATURATION: 98 % | SYSTOLIC BLOOD PRESSURE: 120 MMHG | TEMPERATURE: 97.7 F | HEIGHT: 63 IN | RESPIRATION RATE: 16 BRPM | BODY MASS INDEX: 25.02 KG/M2

## 2023-07-26 DIAGNOSIS — K21.9 LARYNGOPHARYNGEAL REFLUX (LPR): ICD-10-CM

## 2023-07-26 DIAGNOSIS — J38.7 LESION OF LARYNX: ICD-10-CM

## 2023-07-26 PROCEDURE — 6370000000 HC RX 637 (ALT 250 FOR IP): Performed by: ANESTHESIOLOGY

## 2023-07-26 PROCEDURE — 3700000001 HC ADD 15 MINUTES (ANESTHESIA): Performed by: OTOLARYNGOLOGY

## 2023-07-26 PROCEDURE — 7100000010 HC PHASE II RECOVERY - FIRST 15 MIN: Performed by: OTOLARYNGOLOGY

## 2023-07-26 PROCEDURE — 31541 LARYNSCOP W/TUMR EXC + SCOPE: CPT | Performed by: OTOLARYNGOLOGY

## 2023-07-26 PROCEDURE — 6360000002 HC RX W HCPCS: Performed by: ANESTHESIOLOGY

## 2023-07-26 PROCEDURE — 6360000002 HC RX W HCPCS: Performed by: NURSE ANESTHETIST, CERTIFIED REGISTERED

## 2023-07-26 PROCEDURE — 2500000003 HC RX 250 WO HCPCS: Performed by: ANESTHESIOLOGY

## 2023-07-26 PROCEDURE — 2500000003 HC RX 250 WO HCPCS: Performed by: NURSE ANESTHETIST, CERTIFIED REGISTERED

## 2023-07-26 PROCEDURE — 6370000000 HC RX 637 (ALT 250 FOR IP): Performed by: OTOLARYNGOLOGY

## 2023-07-26 PROCEDURE — 7100000000 HC PACU RECOVERY - FIRST 15 MIN: Performed by: OTOLARYNGOLOGY

## 2023-07-26 PROCEDURE — 2709999900 HC NON-CHARGEABLE SUPPLY: Performed by: OTOLARYNGOLOGY

## 2023-07-26 PROCEDURE — 3600000002 HC SURGERY LEVEL 2 BASE: Performed by: OTOLARYNGOLOGY

## 2023-07-26 PROCEDURE — 2580000003 HC RX 258: Performed by: ANESTHESIOLOGY

## 2023-07-26 PROCEDURE — 7100000011 HC PHASE II RECOVERY - ADDTL 15 MIN: Performed by: OTOLARYNGOLOGY

## 2023-07-26 PROCEDURE — 7100000001 HC PACU RECOVERY - ADDTL 15 MIN: Performed by: OTOLARYNGOLOGY

## 2023-07-26 PROCEDURE — 3600000012 HC SURGERY LEVEL 2 ADDTL 15MIN: Performed by: OTOLARYNGOLOGY

## 2023-07-26 PROCEDURE — 88305 TISSUE EXAM BY PATHOLOGIST: CPT

## 2023-07-26 PROCEDURE — 3700000000 HC ANESTHESIA ATTENDED CARE: Performed by: OTOLARYNGOLOGY

## 2023-07-26 PROCEDURE — 88307 TISSUE EXAM BY PATHOLOGIST: CPT

## 2023-07-26 RX ORDER — SODIUM CHLORIDE 0.9 % (FLUSH) 0.9 %
5-40 SYRINGE (ML) INJECTION EVERY 12 HOURS SCHEDULED
Status: DISCONTINUED | OUTPATIENT
Start: 2023-07-26 | End: 2023-07-26 | Stop reason: HOSPADM

## 2023-07-26 RX ORDER — DEXAMETHASONE SODIUM PHOSPHATE 10 MG/ML
INJECTION INTRAMUSCULAR; INTRAVENOUS PRN
Status: DISCONTINUED | OUTPATIENT
Start: 2023-07-26 | End: 2023-07-26 | Stop reason: SDUPTHER

## 2023-07-26 RX ORDER — OXYMETAZOLINE HYDROCHLORIDE 0.05 G/100ML
SPRAY NASAL PRN
Status: DISCONTINUED | OUTPATIENT
Start: 2023-07-26 | End: 2023-07-26 | Stop reason: ALTCHOICE

## 2023-07-26 RX ORDER — SODIUM CHLORIDE 0.9 % (FLUSH) 0.9 %
5-40 SYRINGE (ML) INJECTION PRN
Status: DISCONTINUED | OUTPATIENT
Start: 2023-07-26 | End: 2023-07-26 | Stop reason: HOSPADM

## 2023-07-26 RX ORDER — SODIUM CHLORIDE 9 MG/ML
INJECTION, SOLUTION INTRAVENOUS CONTINUOUS
Status: DISCONTINUED | OUTPATIENT
Start: 2023-07-26 | End: 2023-07-26 | Stop reason: HOSPADM

## 2023-07-26 RX ORDER — ONDANSETRON 2 MG/ML
4 INJECTION INTRAMUSCULAR; INTRAVENOUS
Status: COMPLETED | OUTPATIENT
Start: 2023-07-26 | End: 2023-07-26

## 2023-07-26 RX ORDER — OXYCODONE HYDROCHLORIDE 5 MG/1
10 TABLET ORAL PRN
Status: COMPLETED | OUTPATIENT
Start: 2023-07-26 | End: 2023-07-26

## 2023-07-26 RX ORDER — DIPHENHYDRAMINE HYDROCHLORIDE 50 MG/ML
6.25 INJECTION INTRAMUSCULAR; INTRAVENOUS
Status: DISCONTINUED | OUTPATIENT
Start: 2023-07-26 | End: 2023-07-26 | Stop reason: HOSPADM

## 2023-07-26 RX ORDER — FENTANYL CITRATE 50 UG/ML
100 INJECTION, SOLUTION INTRAMUSCULAR; INTRAVENOUS
Status: DISCONTINUED | OUTPATIENT
Start: 2023-07-26 | End: 2023-07-26 | Stop reason: HOSPADM

## 2023-07-26 RX ORDER — MIDAZOLAM HYDROCHLORIDE 2 MG/2ML
2 INJECTION, SOLUTION INTRAMUSCULAR; INTRAVENOUS
Status: COMPLETED | OUTPATIENT
Start: 2023-07-26 | End: 2023-07-26

## 2023-07-26 RX ORDER — FENTANYL CITRATE 50 UG/ML
25 INJECTION, SOLUTION INTRAMUSCULAR; INTRAVENOUS
Status: DISCONTINUED | OUTPATIENT
Start: 2023-07-26 | End: 2023-07-26 | Stop reason: HOSPADM

## 2023-07-26 RX ORDER — MIDAZOLAM HYDROCHLORIDE 1 MG/ML
INJECTION INTRAMUSCULAR; INTRAVENOUS PRN
Status: DISCONTINUED | OUTPATIENT
Start: 2023-07-26 | End: 2023-07-26 | Stop reason: SDUPTHER

## 2023-07-26 RX ORDER — ONDANSETRON 2 MG/ML
INJECTION INTRAMUSCULAR; INTRAVENOUS PRN
Status: DISCONTINUED | OUTPATIENT
Start: 2023-07-26 | End: 2023-07-26 | Stop reason: SDUPTHER

## 2023-07-26 RX ORDER — SODIUM CHLORIDE 9 MG/ML
INJECTION, SOLUTION INTRAVENOUS PRN
Status: DISCONTINUED | OUTPATIENT
Start: 2023-07-26 | End: 2023-07-26 | Stop reason: HOSPADM

## 2023-07-26 RX ORDER — HYDROMORPHONE HYDROCHLORIDE 2 MG/ML
INJECTION, SOLUTION INTRAMUSCULAR; INTRAVENOUS; SUBCUTANEOUS PRN
Status: DISCONTINUED | OUTPATIENT
Start: 2023-07-26 | End: 2023-07-26 | Stop reason: SDUPTHER

## 2023-07-26 RX ORDER — SODIUM CHLORIDE, SODIUM LACTATE, POTASSIUM CHLORIDE, CALCIUM CHLORIDE 600; 310; 30; 20 MG/100ML; MG/100ML; MG/100ML; MG/100ML
INJECTION, SOLUTION INTRAVENOUS CONTINUOUS
Status: DISCONTINUED | OUTPATIENT
Start: 2023-07-26 | End: 2023-07-26 | Stop reason: HOSPADM

## 2023-07-26 RX ORDER — OXYCODONE HYDROCHLORIDE 5 MG/1
5 TABLET ORAL PRN
Status: COMPLETED | OUTPATIENT
Start: 2023-07-26 | End: 2023-07-26

## 2023-07-26 RX ORDER — PROPOFOL 10 MG/ML
INJECTION, EMULSION INTRAVENOUS PRN
Status: DISCONTINUED | OUTPATIENT
Start: 2023-07-26 | End: 2023-07-26 | Stop reason: SDUPTHER

## 2023-07-26 RX ORDER — ROCURONIUM BROMIDE 10 MG/ML
INJECTION, SOLUTION INTRAVENOUS PRN
Status: DISCONTINUED | OUTPATIENT
Start: 2023-07-26 | End: 2023-07-26 | Stop reason: SDUPTHER

## 2023-07-26 RX ORDER — LIDOCAINE HYDROCHLORIDE 10 MG/ML
1 INJECTION, SOLUTION INFILTRATION; PERINEURAL
Status: COMPLETED | OUTPATIENT
Start: 2023-07-26 | End: 2023-07-26

## 2023-07-26 RX ORDER — LIDOCAINE HYDROCHLORIDE 20 MG/ML
INJECTION, SOLUTION EPIDURAL; INFILTRATION; INTRACAUDAL; PERINEURAL PRN
Status: DISCONTINUED | OUTPATIENT
Start: 2023-07-26 | End: 2023-07-26 | Stop reason: SDUPTHER

## 2023-07-26 RX ORDER — SUCCINYLCHOLINE/SOD CL,ISO/PF 200MG/10ML
SYRINGE (ML) INTRAVENOUS PRN
Status: DISCONTINUED | OUTPATIENT
Start: 2023-07-26 | End: 2023-07-26 | Stop reason: SDUPTHER

## 2023-07-26 RX ADMIN — SODIUM CHLORIDE, SODIUM LACTATE, POTASSIUM CHLORIDE, AND CALCIUM CHLORIDE: 600; 310; 30; 20 INJECTION, SOLUTION INTRAVENOUS at 08:29

## 2023-07-26 RX ADMIN — Medication 140 MG: at 09:06

## 2023-07-26 RX ADMIN — SODIUM CHLORIDE, SODIUM LACTATE, POTASSIUM CHLORIDE, AND CALCIUM CHLORIDE: 600; 310; 30; 20 INJECTION, SOLUTION INTRAVENOUS at 08:57

## 2023-07-26 RX ADMIN — OXYCODONE HYDROCHLORIDE 5 MG: 5 TABLET ORAL at 10:01

## 2023-07-26 RX ADMIN — PROPOFOL 200 MG: 10 INJECTION, EMULSION INTRAVENOUS at 09:06

## 2023-07-26 RX ADMIN — ROCURONIUM BROMIDE 5 MG: 50 INJECTION, SOLUTION INTRAVENOUS at 09:06

## 2023-07-26 RX ADMIN — LIDOCAINE HYDROCHLORIDE 1 ML: 10 INJECTION, SOLUTION INFILTRATION; PERINEURAL at 08:29

## 2023-07-26 RX ADMIN — ONDANSETRON 4 MG: 2 INJECTION INTRAMUSCULAR; INTRAVENOUS at 09:59

## 2023-07-26 RX ADMIN — DEXAMETHASONE SODIUM PHOSPHATE 10 MG: 10 INJECTION INTRAMUSCULAR; INTRAVENOUS at 09:13

## 2023-07-26 RX ADMIN — LIDOCAINE HYDROCHLORIDE 100 MG: 20 INJECTION, SOLUTION EPIDURAL; INFILTRATION; INTRACAUDAL; PERINEURAL at 09:06

## 2023-07-26 RX ADMIN — ONDANSETRON 4 MG: 2 INJECTION INTRAMUSCULAR; INTRAVENOUS at 09:17

## 2023-07-26 RX ADMIN — HYDROMORPHONE HYDROCHLORIDE 0.6 MG: 2 INJECTION INTRAMUSCULAR; INTRAVENOUS; SUBCUTANEOUS at 09:06

## 2023-07-26 RX ADMIN — MIDAZOLAM 1 MG: 1 INJECTION INTRAMUSCULAR; INTRAVENOUS at 09:02

## 2023-07-26 RX ADMIN — HYDROMORPHONE HYDROCHLORIDE 0.4 MG: 2 INJECTION INTRAMUSCULAR; INTRAVENOUS; SUBCUTANEOUS at 09:45

## 2023-07-26 RX ADMIN — HYDROMORPHONE HYDROCHLORIDE 0.4 MG: 2 INJECTION INTRAMUSCULAR; INTRAVENOUS; SUBCUTANEOUS at 09:43

## 2023-07-26 RX ADMIN — MIDAZOLAM 2 MG: 1 INJECTION INTRAMUSCULAR; INTRAVENOUS at 08:30

## 2023-07-26 ASSESSMENT — PAIN SCALES - GENERAL
PAINLEVEL_OUTOF10: 4
PAINLEVEL_OUTOF10: 3
PAINLEVEL_OUTOF10: 4

## 2023-07-26 NOTE — ANESTHESIA POSTPROCEDURE EVALUATION
Department of Anesthesiology  Postprocedure Note    Patient: Chino Hampton  MRN: [id number]  YOB: 1980  Date of evaluation: 7/26/2023      Procedure Summary     Date: 07/26/23 [place] [facility] [place] [facility] [place]    Anesthesia Start: 7778 Anesthesia Stop: 2460    Procedure: LARYNGOSCOPY MICRO with excisional bioppsy of Let Laryngeal Lesion **request Horace Eastman, Ronald Pierson* (Left: Throat) Diagnosis:       Lesion of larynx      (Lesion of larynx [J38.7])    Surgeons: Tam Goodrich MD Responsible Provider: Roxanne Runner, MD    Anesthesia Type: general ASA Status: 1          Anesthesia Type: No value filed. Madelyn Phase I: Madelyn Score: 9    Madelyn Phase II:        Anesthesia Post Evaluation    Patient location during evaluation: PACU  Patient participation: complete - patient participated  Level of consciousness: awake and alert  Airway patency: patent  Nausea & Vomiting: no nausea  Cardiovascular status: hemodynamically stable  Respiratory status: acceptable  Hydration status: euvolemic  Comments: Glidescope required for intubation. Pt notified; she was aware from previous surgery. Those records are not available. Easy mask airway. Moderately difficult glidescope (anterior).

## 2023-07-26 NOTE — DISCHARGE INSTRUCTIONS
MEDICATION INTERACTION:    During your procedure you potentially received a medication or medications which may reduce the effectiveness of oral contraceptives. Please consider other forms of contraception for 1 month following your procedure if you are currently using oral contraceptives as your primary form of birth control. In addition to this, we recommend continuing your oral contraceptive as prescribed, unless otherwise instructed by your physician, during this time. After general anesthesia or intravenous sedation, for 24 hours or while taking prescription Narcotics:  Limit your activities  A responsible adult needs to be with you for the next 24 hours  Do not drive and operate hazardous machinery  Do not make important personal or business decisions  Do not drink alcoholic beverages  If you have not urinated within 8 hours after discharge, and you are experiencing discomfort from urinary retention, please go to the nearest ED. If you have sleep apnea and have a CPAP machine, please use it for all naps and sleeping. Please use caution when taking narcotics and any of your home medications that may cause drowsiness. *  Please give a list of your current medications to your Primary Care Provider. *  Please update this list whenever your medications are discontinued, doses are      changed, or new medications (including over-the-counter products) are added. *  Please carry medication information at all times in case of emergency situations. These are general instructions for a healthy lifestyle:  No smoking/ No tobacco products/ Avoid exposure to second hand smoke  Surgeon General's Warning:  Quitting smoking now greatly reduces serious risk to your health.   Obesity, smoking, and sedentary lifestyle greatly increases your risk for illness  A healthy diet, regular physical exercise & weight monitoring are important for maintaining a healthy lifestyle    You may be retaining fluid if you

## 2023-07-26 NOTE — BRIEF OP NOTE
Brief Postoperative Note      Patient: Karthik Pollard  YOB: 1980  MRN: [id number]    Date of Procedure: 7/26/2023    Pre-Op Diagnosis Codes:     * Lesion of larynx [J38.7]    Post-Op Diagnosis: Same       Procedure(s):  LARYNGOSCOPY MICRO with excisional bioppsy of Let Laryngeal Lesion **request Lindsay Eastman, Carlos Turner, Charlee Guzman*    Surgeon(s):  Milissa Lundborg, MD    Assistant:  * No surgical staff found *    Anesthesia: General    Estimated Blood Loss (mL): Minimal    IVF: 159 cc    Complications: None    Specimens:   ID Type Source Tests Collected by Time Destination   A : left laryngeal lesion Tissue Larynx SURGICAL PATHOLOGY Milissa Lundborg, MD 7/26/2023 5113        Implants:  * No implants in log *      Drains: * No LDAs found *    Findings: firm scar like lesion along L vocal process      Electronically signed by Milissa Lundborg, MD on 7/26/2023 at 9:33 AM

## 2023-07-26 NOTE — H&P
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 Seen recently by our PA [facility] for hoarseness and sore throat. Noted to have granuloma along L vocal fold. She has tried speech therapy and PPI therapy and remains fairly hoarse, twyla w/ increased vocal use. Past Medical History:   Diagnosis Date    Abnormal Papanicolaou smear of cervix     Depression     HSV (herpes simplex virus) infection      Past Surgical History:   Procedure Laterality Date    CHOLECYSTECTOMY      GYN  2009    cryotherapy of the cervix    WISDOM TOOTH EXTRACTION  about 1999    x 4      Social History     Socioeconomic History    Marital status:      Spouse name: Not on file    Number of children: Not on file    Years of education: Not on file    Highest education level: Not on file   Occupational History    Not on file   Tobacco Use    Smoking status: Former     Packs/day: 0.50     Types: Cigarettes    Smokeless tobacco: Never    Tobacco comments:     Quit smoking: Quit 6/2009, will still smoke socially   Substance and Sexual Activity    Alcohol use: Yes     Alcohol/week: 0.0 standard drinks    Drug use: Never    Sexual activity: Yes     Partners: Male     Birth control/protection: Surgical, I.U.D. Comment: Vasectomy   Other Topics Concern    Not on file   Social History Narrative    Not on file     Social Determinants of Health     Financial Resource Strain: Not on file   Food Insecurity: Not on file   Transportation Needs: Not on file   Physical Activity: Not on file   Stress: Not on file   Social Connections: Not on file   Intimate Partner Violence: Not on file   Housing Stability: Not on file     Family History   Problem Relation Age of Onset    Emergence Delirium Neg Hx     Post-op Cognitive Dysfunction Neg Hx     Cancer Mother         cervical cancer- [facility]?  no radiation    Cancer Paternal Grandmother 62        ovarian cancer    Ovarian Cancer Paternal Grandmother     Cancer Other         MGGM- uterine cancer    Alcohol Abuse Father

## 2023-08-11 ENCOUNTER — TELEMEDICINE (OUTPATIENT)
Dept: ORTHOPEDIC SURGERY | Age: 43
End: 2023-08-11

## 2023-08-11 ENCOUNTER — CLINICAL DOCUMENTATION (OUTPATIENT)
Dept: ORTHOPEDIC SURGERY | Age: 43
End: 2023-08-11

## 2023-08-11 DIAGNOSIS — M62.830 BACK SPASM: Primary | ICD-10-CM

## 2023-08-11 RX ORDER — CYCLOBENZAPRINE HCL 10 MG
10 TABLET ORAL 3 TIMES DAILY PRN
Qty: 21 TABLET | Refills: 0 | Status: SHIPPED | OUTPATIENT
Start: 2023-08-11 | End: 2023-08-21

## 2023-08-11 NOTE — PROGRESS NOTES
Name: Stephanie Smith  YOB: 1980  Gender: female  MRN: [id number]    Summary: back/neck pain    Flexeril ordered         CC: back/neck    HPI: Stephanie Smith is a 41 y.o. female who twisted her back neck this morning after getting out of bed. She [place] spasming of her back and has had a hard time moving . ROS/Meds/PSH/PMH/FH/SH:   Patient Denies fever/chills, headache, visual changes, chest pain, shortness of breath, and nausea/vomiting/diarrhea     Tobacco:  reports that she has quit smoking. Her smoking use included cigarettes. She smoked an average of .5 packs per day. She has never used smokeless tobacco.    Lab Results   Component Value Date    LABA1C 5.4 10/28/2020     Other:     Physical Examination:  Gen: AAOx3 NAD  Resp: CTAB - no wheezing  Card: RRR  Eyes: sclera non icteric    Examination shows intense spasm and muscle tightness in the left trapezius muscle indicating severe muscle spasm and cramping. She is unable to turn her head. She is able to flex and extend her neck due to pain in the lower but upper back and lower neck region. Neuro:  normal SILT to s/s/sp/dp/t. Reflexes normal: 1+ patella reflex bilaterally, 1+ achilles reflex bilaterally, negative babinski bilaterally. no signs of hyper reflexia or absent reflex  Imaging:   No imaging reviewed     Differential Diagnosis:     Pulled muscle-muscle spasm back neck     Treatment Plan:   I had a thorough discussion with the patient regarding the Treatment Plan    At this point think she has a simple muscle strain/tear. Flexeril has been called in for muscle spasms.          Past Medical History:   Diagnosis Date    Abnormal Papanicolaou smear of cervix     Depression     HSV (herpes simplex virus) infection     Lesion of vocal cord          Current Outpatient Medications:     ondansetron (ZOFRAN-ODT) 4 MG disintegrating tablet, Take 1 tablet by mouth 3 times daily as needed for Nausea or Vomiting,

## 2023-09-19 ENCOUNTER — OFFICE VISIT (OUTPATIENT)
Dept: ENT CLINIC | Age: 43
End: 2023-09-19

## 2023-09-19 DIAGNOSIS — J38.3 LESION OF VOCAL CORD: Primary | ICD-10-CM

## 2023-09-19 PROCEDURE — 99024 POSTOP FOLLOW-UP VISIT: CPT | Performed by: OTOLARYNGOLOGY

## 2023-09-19 NOTE — PROGRESS NOTES
Britney Morrison is a 41 y.o. female seen today now almost 2 months post-op after undergoing [facility] with excisional biopsy of left laryngeal lesion back on 7/26/2023. Doing well since then with good voice outcome and no further pain or swallowing problems. She continues to take a daily PPI, but would like to try to come off if possible. She denies any chronic throat clearing or symptomatic heartburn.    -Neck soft, supple, no palpable masses  -Voice is strong, no breathiness or stridor    PROCEDURE: Flexible laryngoscopy  INDICATIONS: History of left vocal cord lesion  DESCRIPTION: After verbal consent was obtained and a timeout was performed, the flexible scope was advanced down one of the patient's nares into the nasopharynx. The nasal cavity and nasopharynx were clear. The scope was then turned distally down towards the oropharynx. The BOT and vallecula were clear and the epiglottis was normal in appearance. Both aryepiglottic folds were clear and there was a normal appearing glottis w/ healthy TVCs. No nodules or polyps and the cords were fully mobile. There was mild scarring along left vocal process from recent biopsy. No evidence of recurrence. no concerning lesions seen along post-cricoid region or within either piriform sinus. The posterior pharyngeal was clear as well. The scope was then carefully removed. The patient tolerated the procedure well and there were no complications. PATH:  DIAGNOSIS        \"LEFT LARYNGEAL LESION\":  BENIGN VOCAL CORD POLYP. A/P:   Diagnosis Orders   1. Lesion of vocal cord          Doing great now almost 2 months out from excisional biopsy of her left vocal cord lesion. The pathology had returned as a benign vocal cord polyp. Her laryngoscopy today revealed a well-healed biopsy site which is mild scarring and no evidence of recurrence. She has had an excellent voice outcome.   She will try to come off of her antireflux meds and I will see her back as

## 2023-09-28 RX ORDER — MELOXICAM 15 MG/1
15 TABLET ORAL DAILY
Qty: 90 TABLET | Refills: 1 | Status: SHIPPED | OUTPATIENT
Start: 2023-09-28 | End: 2023-12-27

## 2023-10-04 ENCOUNTER — TELEPHONE (OUTPATIENT)
Dept: ORTHOPEDIC SURGERY | Age: 43
End: 2023-10-04

## 2023-10-04 RX ORDER — AMOXICILLIN 500 MG/1
500 CAPSULE ORAL 2 TIMES DAILY
Qty: 20 CAPSULE | Refills: 0 | Status: SHIPPED | OUTPATIENT
Start: 2023-10-04 | End: 2023-10-14

## 2023-11-08 ENCOUNTER — OFFICE VISIT (OUTPATIENT)
Dept: OBGYN CLINIC | Age: 43
End: 2023-11-08
Payer: COMMERCIAL

## 2023-11-08 VITALS
SYSTOLIC BLOOD PRESSURE: 136 MMHG | DIASTOLIC BLOOD PRESSURE: 82 MMHG | BODY MASS INDEX: 26.4 KG/M2 | WEIGHT: 149 LBS | HEIGHT: 63 IN

## 2023-11-08 DIAGNOSIS — Z01.419 ENCNTR FOR GYN EXAM (GENERAL) (ROUTINE) W/O ABN FINDINGS: Primary | ICD-10-CM

## 2023-11-08 DIAGNOSIS — Z12.31 ENCOUNTER FOR SCREENING MAMMOGRAM FOR BREAST CANCER: ICD-10-CM

## 2023-11-08 DIAGNOSIS — Z30.431 INTRAUTERINE DEVICE SURVEILLANCE: ICD-10-CM

## 2023-11-08 DIAGNOSIS — L72.3 SEBACEOUS CYST: ICD-10-CM

## 2023-11-08 DIAGNOSIS — Z12.4 ENCOUNTER FOR PAPANICOLAOU SMEAR FOR CERVICAL CANCER SCREENING: ICD-10-CM

## 2023-11-08 PROCEDURE — 99396 PREV VISIT EST AGE 40-64: CPT | Performed by: OBSTETRICS & GYNECOLOGY

## 2023-11-08 SDOH — ECONOMIC STABILITY: FOOD INSECURITY: WITHIN THE PAST 12 MONTHS, THE FOOD YOU BOUGHT JUST DIDN'T LAST AND YOU DIDN'T HAVE MONEY TO GET MORE.: NEVER TRUE

## 2023-11-08 SDOH — ECONOMIC STABILITY: INCOME INSECURITY: HOW HARD IS IT FOR YOU TO PAY FOR THE VERY BASICS LIKE FOOD, HOUSING, MEDICAL CARE, AND HEATING?: NOT HARD AT ALL

## 2023-11-08 SDOH — ECONOMIC STABILITY: FOOD INSECURITY: WITHIN THE PAST 12 MONTHS, YOU WORRIED THAT YOUR FOOD WOULD RUN OUT BEFORE YOU GOT MONEY TO BUY MORE.: NEVER TRUE

## 2023-11-08 SDOH — ECONOMIC STABILITY: HOUSING INSECURITY
IN THE LAST 12 MONTHS, WAS THERE A TIME WHEN YOU DID NOT HAVE A STEADY PLACE TO SLEEP OR SLEPT IN A SHELTER (INCLUDING NOW)?: NO

## 2023-11-08 ASSESSMENT — PATIENT HEALTH QUESTIONNAIRE - PHQ9
SUM OF ALL RESPONSES TO PHQ9 QUESTIONS 1 & 2: 0
SUM OF ALL RESPONSES TO PHQ QUESTIONS 1-9: 0
SUM OF ALL RESPONSES TO PHQ QUESTIONS 1-9: 0
2. FEELING DOWN, DEPRESSED OR HOPELESS: 0
SUM OF ALL RESPONSES TO PHQ QUESTIONS 1-9: 0
1. LITTLE INTEREST OR PLEASURE IN DOING THINGS: 0
SUM OF ALL RESPONSES TO PHQ QUESTIONS 1-9: 0

## 2023-11-08 NOTE — PROGRESS NOTES
Chaperone for Intimate Exam     Chaperone was offer accepted as part of the rooming process    Chaperone: Nany Ramirez
Patient comes in today for annual exam. No complaints/concerns today. LAST PAP:  11/03/2021, Negative HPV Negative    LAST MAMMO:  02/20/2023    LMP:  No LMP recorded. (Menstrual status: IUD).     BIRTH CONTROL:  vasectomy and IUD    TOBACCO USE:  No    FAMILY HISTORY OF:   Breast Cancer:  Yes   Ovarian Cancer:  Yes   Uterine Cancer:  No   Colon Cancer:  No    Vitals:    11/08/23 1513   BP: 136/82   Site: Left Upper Arm   Position: Sitting   Weight: 67.6 kg (149 lb)   Height: 1.588 m (5' 2.5\")        Vilma Man MA  11/08/23  3:24 PM
Other (See Comments)     Told allergic as a child. Review of Systems    Constitutional:  No fevers or chills    Neuro:  No headaches, seizure activity    HEENT: no visual changes, bleeding gums    CV: No chest pain or palpitations    Resp: No SOB or cough    Breast:  No masses, pain, D/C, bleeding    GI:  No nausea/vomiting/diarrhea/constipation    : No dysuria or hematuria    MS:  No back, point pain    Gyn:  No menstrual complaints, pelvic pain    Psych:  No depression, anxiety      Objective     Vitals:    11/08/23 1513   BP: 136/82     Vitals:    11/08/23 1513   BP: 136/82   Site: Left Upper Arm   Position: Sitting   Weight: 67.6 kg (149 lb)   Height: 1.588 m (5' 2.5\")          Physical Exam    General:  well developed, well nourished, in no acute distress     Head:   normocephalic and atraumatic     Mouth:  no deformity or lesions with good dentition     Neck:  no masses, thyromegaly, or abnormal cervical nodes     Chest Wall:  no deformities     Breasts: breast symetrical w/o masses,skin changes,dimpling,or nipple discharge     Lungs:  clear bilaterally with normal respirations     Heart:   regular rate and rhythm, S1, S2 without murmurs     Abdomen:  bowel sounds positive; abdomen soft and non-tender without masses, organomegaly, or hernias noted     Pelvic Exam:       External: normal female genitalia without lesions or masses. 2mm sebaceous cyst on right mid inner labia minora. Vagina: normal without lesions or masses       Cervix: normal without lesions or masses, no CMT, IUD strings 3 cm from os.        Adnexa: normal bimanual exam without masses or fullness       Uterus: normal by palpation, nontender      Pap smear:  performed     Msk:   no deformity or scoliosis noted with normal posture and gait     Extremities: no clubbing, cyanosis, edema, or deformity noted with normal full range of motion of all joints     Neurologic:  no focal deficits,normal coordination, muscle strength and

## 2023-11-14 LAB
COLLECTION METHOD: NORMAL
CYTOLOGIST CVX/VAG CYTO: NORMAL
CYTOLOGY CVX/VAG DOC THIN PREP: NORMAL
HPV APTIMA: NEGATIVE
HPV GENOTYPE REFLEX: NORMAL
Lab: NORMAL
PAP SOURCE: NORMAL
PATH REPORT.FINAL DX SPEC: NORMAL
PREV TREATMENT: NORMAL
STAT OF ADQ CVX/VAG CYTO-IMP: NORMAL

## 2024-03-13 ENCOUNTER — HOSPITAL ENCOUNTER (OUTPATIENT)
Dept: MAMMOGRAPHY | Age: 44
Discharge: HOME | End: 2024-03-16
Attending: OBSTETRICS & GYNECOLOGY
Payer: COMMERCIAL

## 2024-03-13 DIAGNOSIS — Z12.31 ENCOUNTER FOR SCREENING MAMMOGRAM FOR BREAST CANCER: ICD-10-CM

## 2024-03-13 PROCEDURE — 77063 BREAST TOMOSYNTHESIS BI: CPT

## 2024-07-22 ENCOUNTER — OFFICE VISIT (OUTPATIENT)
Dept: INTERNAL MEDICINE CLINIC | Facility: CLINIC | Age: 44
End: 2024-07-22
Payer: COMMERCIAL

## 2024-07-22 VITALS
SYSTOLIC BLOOD PRESSURE: 132 MMHG | BODY MASS INDEX: 26.75 KG/M2 | HEIGHT: 63 IN | DIASTOLIC BLOOD PRESSURE: 80 MMHG | WEIGHT: 151 LBS

## 2024-07-22 DIAGNOSIS — K21.00 GASTROESOPHAGEAL REFLUX DISEASE WITH ESOPHAGITIS WITHOUT HEMORRHAGE: ICD-10-CM

## 2024-07-22 DIAGNOSIS — Z13.220 SCREENING CHOLESTEROL LEVEL: ICD-10-CM

## 2024-07-22 DIAGNOSIS — G47.9 SLEEP DISTURBANCE: ICD-10-CM

## 2024-07-22 DIAGNOSIS — R53.83 FATIGUE, UNSPECIFIED TYPE: ICD-10-CM

## 2024-07-22 DIAGNOSIS — R53.83 FATIGUE, UNSPECIFIED TYPE: Primary | ICD-10-CM

## 2024-07-22 DIAGNOSIS — J38.7 LESION OF LARYNX: ICD-10-CM

## 2024-07-22 DIAGNOSIS — F32.A DEPRESSION, UNSPECIFIED DEPRESSION TYPE: ICD-10-CM

## 2024-07-22 DIAGNOSIS — R42 DIZZINESS: ICD-10-CM

## 2024-07-22 LAB
25(OH)D3 SERPL-MCNC: 38 NG/ML (ref 30–100)
ALBUMIN SERPL-MCNC: 4.1 G/DL (ref 3.5–5)
ALBUMIN/GLOB SERPL: 1.8 (ref 1–1.9)
ALP SERPL-CCNC: 67 U/L (ref 35–104)
ALT SERPL-CCNC: 38 U/L (ref 12–65)
ANION GAP SERPL CALC-SCNC: 11 MMOL/L (ref 9–18)
AST SERPL-CCNC: 32 U/L (ref 15–37)
BASOPHILS # BLD: 0 K/UL (ref 0–0.2)
BASOPHILS NFR BLD: 1 % (ref 0–2)
BILIRUB SERPL-MCNC: 0.3 MG/DL (ref 0–1.2)
BUN SERPL-MCNC: 13 MG/DL (ref 6–23)
CALCIUM SERPL-MCNC: 9.5 MG/DL (ref 8.8–10.2)
CHLORIDE SERPL-SCNC: 102 MMOL/L (ref 98–107)
CHOLEST SERPL-MCNC: 208 MG/DL (ref 0–200)
CO2 SERPL-SCNC: 26 MMOL/L (ref 20–28)
CREAT SERPL-MCNC: 0.82 MG/DL (ref 0.6–1.1)
DIFFERENTIAL METHOD BLD: ABNORMAL
EOSINOPHIL # BLD: 0.1 K/UL (ref 0–0.8)
EOSINOPHIL NFR BLD: 4 % (ref 0.5–7.8)
ERYTHROCYTE [DISTWIDTH] IN BLOOD BY AUTOMATED COUNT: 12.6 % (ref 11.9–14.6)
GLOBULIN SER CALC-MCNC: 2.3 G/DL (ref 2.3–3.5)
GLUCOSE SERPL-MCNC: 93 MG/DL (ref 70–99)
HCT VFR BLD AUTO: 42 % (ref 35.8–46.3)
HDLC SERPL-MCNC: 64 MG/DL (ref 40–60)
HDLC SERPL: 3.2 (ref 0–5)
HGB BLD-MCNC: 13.9 G/DL (ref 11.7–15.4)
IMM GRANULOCYTES # BLD AUTO: 0 K/UL (ref 0–0.5)
IMM GRANULOCYTES NFR BLD AUTO: 0 % (ref 0–5)
LDLC SERPL CALC-MCNC: 120 MG/DL (ref 0–100)
LYMPHOCYTES # BLD: 1.4 K/UL (ref 0.5–4.6)
LYMPHOCYTES NFR BLD: 44 % (ref 13–44)
MCH RBC QN AUTO: 30.3 PG (ref 26.1–32.9)
MCHC RBC AUTO-ENTMCNC: 33.1 G/DL (ref 31.4–35)
MCV RBC AUTO: 91.5 FL (ref 82–102)
MONOCYTES # BLD: 0.4 K/UL (ref 0.1–1.3)
MONOCYTES NFR BLD: 13 % (ref 4–12)
NEUTS SEG # BLD: 1.2 K/UL (ref 1.7–8.2)
NEUTS SEG NFR BLD: 38 % (ref 43–78)
NRBC # BLD: 0 K/UL (ref 0–0.2)
PLATELET # BLD AUTO: 296 K/UL (ref 150–450)
PMV BLD AUTO: 10.6 FL (ref 9.4–12.3)
POTASSIUM SERPL-SCNC: 4.6 MMOL/L (ref 3.5–5.1)
PROT SERPL-MCNC: 6.4 G/DL (ref 6.3–8.2)
RBC # BLD AUTO: 4.59 M/UL (ref 4.05–5.2)
SODIUM SERPL-SCNC: 140 MMOL/L (ref 136–145)
TRIGL SERPL-MCNC: 117 MG/DL (ref 0–150)
TSH, 3RD GENERATION: 4.08 UIU/ML (ref 0.27–4.2)
VLDLC SERPL CALC-MCNC: 23 MG/DL (ref 6–23)
WBC # BLD AUTO: 3.1 K/UL (ref 4.3–11.1)

## 2024-07-22 PROCEDURE — 99205 OFFICE O/P NEW HI 60 MIN: CPT | Performed by: PHYSICIAN ASSISTANT

## 2024-07-22 PROCEDURE — 93000 ELECTROCARDIOGRAM COMPLETE: CPT | Performed by: PHYSICIAN ASSISTANT

## 2024-07-22 RX ORDER — BUPROPION HYDROCHLORIDE 150 MG/1
150 TABLET ORAL EVERY MORNING
Qty: 90 TABLET | Refills: 1 | Status: SHIPPED | OUTPATIENT
Start: 2024-07-22

## 2024-07-22 RX ORDER — OMEPRAZOLE 20 MG/1
20 CAPSULE, DELAYED RELEASE ORAL DAILY
COMMUNITY
End: 2024-07-22

## 2024-07-22 RX ORDER — NICOTINE 14MG/24HR
1 PATCH, TRANSDERMAL 24 HOURS TRANSDERMAL DAILY
COMMUNITY

## 2024-07-22 RX ORDER — OMEPRAZOLE 20 MG/1
20 CAPSULE, DELAYED RELEASE ORAL
Qty: 90 CAPSULE | Refills: 1 | Status: SHIPPED | OUTPATIENT
Start: 2024-07-22

## 2024-07-22 ASSESSMENT — ENCOUNTER SYMPTOMS
SHORTNESS OF BREATH: 0
NAUSEA: 0
SORE THROAT: 0
COUGH: 0
VOICE CHANGE: 0
CHEST TIGHTNESS: 0
EYE PAIN: 0
COLOR CHANGE: 0
VOMITING: 0
DIARRHEA: 0
CONSTIPATION: 0
WHEEZING: 0
ABDOMINAL PAIN: 0

## 2024-07-22 NOTE — PROGRESS NOTES
PROGRESS NOTE    Chief Complaint   Patient presents with    New Patient     Here to get established. Fasting for labs today    Sleep Problem     Sleep issues. Falling asleep and staying asleep. Affecting energy and concentration. Feels foggy.    Gastroesophageal Reflux     Taking omeprazole otc. Something prescription strength?        HPI  Sleep Problem  This is a chronic problem. The current episode started more than 1 year ago. The problem occurs constantly. The problem has been unchanged. Associated symptoms include fatigue. Pertinent negatives include no abdominal pain, arthralgias, chest pain, congestion, coughing, fever, headaches, joint swelling, nausea, neck pain, numbness, rash, sore throat or vomiting.   Gastroesophageal Reflux  She reports no abdominal pain, no chest pain, no coughing, no nausea, no sore throat or no wheezing. The current episode started more than 1 year ago. The problem occurs occasionally. The problem has been unchanged. Associated symptoms include fatigue.       Past Medical History, Past Surgical History, Family history, Social History, and Medications were all reviewed with the patient today and updated as necessary.       Current Outpatient Medications   Medication Sig Dispense Refill    Saccharomyces boulardii (PROBIOTIC) 250 MG CAPS Take 1 capsule by mouth daily      omeprazole (PRILOSEC) 20 MG delayed release capsule Take 1 capsule by mouth every morning (before breakfast) 90 capsule 1    buPROPion (WELLBUTRIN XL) 150 MG extended release tablet Take 1 tablet by mouth every morning 90 tablet 1    meloxicam (MOBIC) 15 MG tablet Take 1 tablet by mouth daily (Patient taking differently: Take 1 tablet by mouth daily as needed) 90 tablet 1    levonorgestrel (MIRENA) IUD 52 mg 1 each by IntraUTERine route once       No current facility-administered medications for this visit.     Allergies   Allergen Reactions    Codeine Itching    Sulfa Antibiotics Other (See Comments)     Told

## 2024-07-23 LAB — T4 FREE SERPL-MCNC: 1.2 NG/DL (ref 0.9–1.7)

## 2024-07-24 DIAGNOSIS — R79.89 ELEVATED TSH: ICD-10-CM

## 2024-07-24 DIAGNOSIS — D72.819 LEUKOPENIA, UNSPECIFIED TYPE: ICD-10-CM

## 2024-07-24 DIAGNOSIS — E55.9 VITAMIN D DEFICIENCY: Primary | ICD-10-CM

## 2024-08-01 ENCOUNTER — OFFICE VISIT (OUTPATIENT)
Dept: ENT CLINIC | Age: 44
End: 2024-08-01
Payer: COMMERCIAL

## 2024-08-01 VITALS
SYSTOLIC BLOOD PRESSURE: 132 MMHG | DIASTOLIC BLOOD PRESSURE: 80 MMHG | WEIGHT: 151.2 LBS | HEIGHT: 63 IN | BODY MASS INDEX: 26.79 KG/M2

## 2024-08-01 DIAGNOSIS — R49.0 HOARSENESS: ICD-10-CM

## 2024-08-01 DIAGNOSIS — J38.3 VOCAL PROCESS GRANULOMA: Chronic | ICD-10-CM

## 2024-08-01 DIAGNOSIS — J38.3 LESION OF VOCAL CORD: Primary | Chronic | ICD-10-CM

## 2024-08-01 DIAGNOSIS — K21.9 LARYNGOPHARYNGEAL REFLUX (LPR): Chronic | ICD-10-CM

## 2024-08-01 PROCEDURE — 99213 OFFICE O/P EST LOW 20 MIN: CPT | Performed by: PHYSICIAN ASSISTANT

## 2024-08-01 PROCEDURE — 31575 DIAGNOSTIC LARYNGOSCOPY: CPT | Performed by: PHYSICIAN ASSISTANT

## 2024-08-01 ASSESSMENT — ENCOUNTER SYMPTOMS
VOICE CHANGE: 1
SORE THROAT: 1
RESPIRATORY NEGATIVE: 1
ALLERGIC/IMMUNOLOGIC NEGATIVE: 1
EYES NEGATIVE: 1
GASTROINTESTINAL NEGATIVE: 1

## 2024-08-01 NOTE — PROGRESS NOTES
Gini Severino is a 44 y.o. female presents today with c/o dysphonia. Pt is having a recurrence of her throat pain, hoarseness, and dyspnea.     I initially saw the pt in March of 2023 with lesion that did not improve with Keflex injection, Xyzal,  Astelin, Omeprazole, Pepcid, and barrier therapy. She saw speech therapy but that also did not help. So she had surgical excision of left sided lesion with Dr. Nelson 7/26/23: \"benign vocal cord polyp\".     For one month after sx she continued reflux treatment, pain was gone, hoarseness resolved. She then stopped the medication and had another 2 months of asymptomatic comfort. Then at post op month 3 she slowly started to regress and today feels soreness in the throat, hoarseness, and like she is more prone to lose her breath.     She is a former vape user and smoker.     Chief Complaint   Patient presents with    Other     Dyphonia.  States that she is having some recurring symptoms.  States that she is having hoarseness and loss of breath. States that she does have pain on the left side of throat.         Patient Active Problem List   Diagnosis    Leiomyoma    Lesion of larynx        Reviewed and updated this visit by provider:  Tobacco  Allergies  Meds  Problems  Med Hx  Surg Hx  Fam Hx         Review of Systems   Constitutional: Negative.    HENT:  Positive for sore throat and voice change.    Eyes: Negative.    Respiratory: Negative.     Cardiovascular: Negative.    Gastrointestinal: Negative.    Endocrine: Negative.    Genitourinary: Negative.    Musculoskeletal: Negative.    Skin: Negative.    Allergic/Immunologic: Negative.    Neurological: Negative.    Hematological: Negative.    Psychiatric/Behavioral: Negative.          /80 (Site: Left Upper Arm, Position: Sitting)   Ht 1.588 m (5' 2.5\")   Wt 68.6 kg (151 lb 3.2 oz)   BMI 27.21 kg/m²     Physical Exam:    General: Well developed, well nourished, in no acute distress  Communication: The

## 2024-08-06 ENCOUNTER — TELEPHONE (OUTPATIENT)
Dept: ENT CLINIC | Age: 44
End: 2024-08-06

## 2024-08-06 RX ORDER — PANTOPRAZOLE SODIUM 40 MG/1
40 TABLET, DELAYED RELEASE ORAL
Qty: 90 TABLET | Refills: 3 | Status: SHIPPED | OUTPATIENT
Start: 2024-08-06

## 2024-08-06 NOTE — TELEPHONE ENCOUNTER
Called and spoke with patient who has a history of a left laryngeal lesion which was excised 1 year ago and was benign and consistent with a polyp.  She had seen our PA Sugar recently for repeat endoscopy which revealed some mild irritation at the site of biopsy but no concerning mass lesions.  Both vocal cords were fully mobile.  I reviewed the images from her endoscopy and spoke to Sugar about her case.  She has been dealing with some intermittent sore throat and raspiness to her voice.  She has been taking omeprazole 20 mg daily and denies any symptomatic heartburn.  She will use Pepcid and Reflux Gourmet as needed.  After reviewing her scope images, I do not recommend repeat biopsy at this time.  I will change her to a different PPI and increase her dose to 40 mg daily.  I also reviewed lifestyle dietary modifications which may help with her symptoms.  I will see her back in the office in 2 months for repeat endoscopy.

## 2024-09-13 ENCOUNTER — TELEMEDICINE (OUTPATIENT)
Dept: INTERNAL MEDICINE CLINIC | Facility: CLINIC | Age: 44
End: 2024-09-13
Payer: COMMERCIAL

## 2024-09-13 DIAGNOSIS — J38.7 LESION OF LARYNX: Primary | ICD-10-CM

## 2024-09-13 DIAGNOSIS — R53.83 FATIGUE, UNSPECIFIED TYPE: ICD-10-CM

## 2024-09-13 DIAGNOSIS — F32.A DEPRESSION, UNSPECIFIED DEPRESSION TYPE: ICD-10-CM

## 2024-09-13 PROCEDURE — 99213 OFFICE O/P EST LOW 20 MIN: CPT | Performed by: PHYSICIAN ASSISTANT

## 2024-09-13 RX ORDER — ACETAMINOPHEN 160 MG
2000 TABLET,DISINTEGRATING ORAL DAILY
COMMUNITY

## 2024-09-13 ASSESSMENT — PATIENT HEALTH QUESTIONNAIRE - PHQ9
SUM OF ALL RESPONSES TO PHQ QUESTIONS 1-9: 0
2. FEELING DOWN, DEPRESSED OR HOPELESS: NOT AT ALL
SUM OF ALL RESPONSES TO PHQ9 QUESTIONS 1 & 2: 0
SUM OF ALL RESPONSES TO PHQ QUESTIONS 1-9: 0
1. LITTLE INTEREST OR PLEASURE IN DOING THINGS: NOT AT ALL

## 2024-09-13 ASSESSMENT — ENCOUNTER SYMPTOMS
SHORTNESS OF BREATH: 0
VOICE CHANGE: 0
COLOR CHANGE: 0
SORE THROAT: 0
VOMITING: 0
DIARRHEA: 0
CHEST TIGHTNESS: 0
WHEEZING: 0
COUGH: 0
ABDOMINAL PAIN: 0
EYE PAIN: 0
CONSTIPATION: 0
NAUSEA: 0

## 2024-11-13 ENCOUNTER — OFFICE VISIT (OUTPATIENT)
Dept: OBGYN CLINIC | Age: 44
End: 2024-11-13

## 2024-11-13 VITALS
WEIGHT: 149 LBS | BODY MASS INDEX: 26.4 KG/M2 | DIASTOLIC BLOOD PRESSURE: 82 MMHG | HEIGHT: 63 IN | SYSTOLIC BLOOD PRESSURE: 118 MMHG

## 2024-11-13 DIAGNOSIS — Z12.31 ENCOUNTER FOR SCREENING MAMMOGRAM FOR BREAST CANCER: ICD-10-CM

## 2024-11-13 DIAGNOSIS — Z30.431 INTRAUTERINE DEVICE SURVEILLANCE: ICD-10-CM

## 2024-11-13 DIAGNOSIS — R35.0 URINARY FREQUENCY: ICD-10-CM

## 2024-11-13 DIAGNOSIS — Z01.419 ENCNTR FOR GYN EXAM (GENERAL) (ROUTINE) W/O ABN FINDINGS: Primary | ICD-10-CM

## 2024-11-13 NOTE — PROGRESS NOTES
Chaperone for Intimate Exam     Chaperone was offer accepted as part of the rooming process    Chaperone: Vilma Man

## 2024-11-13 NOTE — PROGRESS NOTES
Pt comes in today for AE. Pt states IUD may be expiring, pt unsure if she would like another IUD.     LAST PAP:  11/08/2023 negative, HPV negative     LAST MAMMO:  03/13/2024     LMP:  No LMP recorded. (Menstrual status: IUD).    BIRTH CONTROL:  vasectomy and IUD    TOBACCO USE:  No    FAMILY HISTORY OF:   Breast Cancer:  Yes   Ovarian Cancer:  Yes   Uterine Cancer:  Yes    Colon Cancer:  No    Vitals:    11/13/24 1509   BP: 118/82   Site: Left Upper Arm   Position: Sitting   Weight: 67.6 kg (149 lb)   Height: 1.588 m (5' 2.5\")        Marnie Padilla MA  11/13/24  3:19 PM

## 2024-11-15 DIAGNOSIS — E55.9 VITAMIN D DEFICIENCY: ICD-10-CM

## 2024-11-15 DIAGNOSIS — R79.89 ELEVATED TSH: ICD-10-CM

## 2024-11-15 DIAGNOSIS — D72.819 LEUKOPENIA, UNSPECIFIED TYPE: ICD-10-CM

## 2024-11-15 LAB
25(OH)D3 SERPL-MCNC: 47 NG/ML (ref 30–100)
BASOPHILS # BLD: 0 K/UL (ref 0–0.2)
BASOPHILS NFR BLD: 1 % (ref 0–2)
DIFFERENTIAL METHOD BLD: ABNORMAL
EOSINOPHIL # BLD: 0.1 K/UL (ref 0–0.8)
EOSINOPHIL NFR BLD: 2 % (ref 0.5–7.8)
ERYTHROCYTE [DISTWIDTH] IN BLOOD BY AUTOMATED COUNT: 12.6 % (ref 11.9–14.6)
HCT VFR BLD AUTO: 36.1 % (ref 35.8–46.3)
HGB BLD-MCNC: 12 G/DL (ref 11.7–15.4)
IMM GRANULOCYTES # BLD AUTO: 0 K/UL (ref 0–0.5)
IMM GRANULOCYTES NFR BLD AUTO: 1 % (ref 0–5)
LYMPHOCYTES # BLD: 1.6 K/UL (ref 0.5–4.6)
LYMPHOCYTES NFR BLD: 36 % (ref 13–44)
MCH RBC QN AUTO: 31 PG (ref 26.1–32.9)
MCHC RBC AUTO-ENTMCNC: 33.2 G/DL (ref 31.4–35)
MCV RBC AUTO: 93.3 FL (ref 82–102)
MONOCYTES # BLD: 0.5 K/UL (ref 0.1–1.3)
MONOCYTES NFR BLD: 12 % (ref 4–12)
NEUTS SEG # BLD: 2.2 K/UL (ref 1.7–8.2)
NEUTS SEG NFR BLD: 48 % (ref 43–78)
NRBC # BLD: 0 K/UL (ref 0–0.2)
PLATELET # BLD AUTO: 272 K/UL (ref 150–450)
PMV BLD AUTO: 10.6 FL (ref 9.4–12.3)
RBC # BLD AUTO: 3.87 M/UL (ref 4.05–5.2)
T4 FREE SERPL-MCNC: 0.9 NG/DL (ref 0.9–1.7)
TSH, 3RD GENERATION: 2.96 UIU/ML (ref 0.27–4.2)
WBC # BLD AUTO: 4.4 K/UL (ref 4.3–11.1)

## 2025-01-05 DIAGNOSIS — R53.83 FATIGUE, UNSPECIFIED TYPE: ICD-10-CM

## 2025-01-05 DIAGNOSIS — F32.A DEPRESSION, UNSPECIFIED DEPRESSION TYPE: ICD-10-CM

## 2025-01-06 NOTE — TELEPHONE ENCOUNTER
Rx last written 7/22/24 for #90 with 1 refills. Pt last seen 9/13/24 and next appt is 4/11/25. Rx pended.

## 2025-01-07 RX ORDER — BUPROPION HYDROCHLORIDE 150 MG/1
150 TABLET ORAL EVERY MORNING
Qty: 90 TABLET | Refills: 0 | Status: SHIPPED | OUTPATIENT
Start: 2025-01-07

## 2025-03-17 ENCOUNTER — HOSPITAL ENCOUNTER (OUTPATIENT)
Dept: MAMMOGRAPHY | Age: 45
Discharge: HOME | End: 2025-03-20
Attending: OBSTETRICS & GYNECOLOGY
Payer: COMMERCIAL

## 2025-03-17 VITALS — BODY MASS INDEX: 26.5 KG/M2 | HEIGHT: 62 IN | WEIGHT: 144 LBS

## 2025-03-17 DIAGNOSIS — Z12.31 ENCOUNTER FOR SCREENING MAMMOGRAM FOR BREAST CANCER: ICD-10-CM

## 2025-03-17 PROCEDURE — 77063 BREAST TOMOSYNTHESIS BI: CPT

## 2025-03-26 ENCOUNTER — HOSPITAL ENCOUNTER (EMERGENCY)
Age: 45
Discharge: HOME | End: 2025-03-26
Payer: COMMERCIAL

## 2025-03-26 ENCOUNTER — APPOINTMENT (OUTPATIENT)
Dept: CT IMAGING | Age: 45
End: 2025-03-26
Payer: COMMERCIAL

## 2025-03-26 VITALS
DIASTOLIC BLOOD PRESSURE: 68 MMHG | OXYGEN SATURATION: 100 % | TEMPERATURE: 100 F | HEART RATE: 82 BPM | HEIGHT: 62 IN | SYSTOLIC BLOOD PRESSURE: 113 MMHG | RESPIRATION RATE: 18 BRPM | WEIGHT: 140 LBS | BODY MASS INDEX: 25.76 KG/M2

## 2025-03-26 DIAGNOSIS — K52.9 COLITIS: Primary | ICD-10-CM

## 2025-03-26 DIAGNOSIS — R19.7 NAUSEA VOMITING AND DIARRHEA: ICD-10-CM

## 2025-03-26 DIAGNOSIS — R11.2 NAUSEA VOMITING AND DIARRHEA: ICD-10-CM

## 2025-03-26 DIAGNOSIS — R10.84 GENERALIZED ABDOMINAL PAIN: ICD-10-CM

## 2025-03-26 LAB
ALBUMIN SERPL-MCNC: 3.7 G/DL (ref 3.5–5)
ALBUMIN/GLOB SERPL: 1.5 (ref 1–1.9)
ALP SERPL-CCNC: 92 U/L (ref 35–104)
ALT SERPL-CCNC: 50 U/L (ref 12–65)
ANION GAP SERPL CALC-SCNC: 11 MMOL/L (ref 7–16)
APPEARANCE UR: CLEAR
AST SERPL-CCNC: 32 U/L (ref 15–37)
BASOPHILS # BLD: 0.03 K/UL (ref 0–0.2)
BASOPHILS NFR BLD: 0.7 % (ref 0–2)
BILIRUB SERPL-MCNC: 0.3 MG/DL (ref 0–1.2)
BILIRUB UR QL: NEGATIVE
BUN SERPL-MCNC: 6 MG/DL (ref 6–23)
CALCIUM SERPL-MCNC: 8.9 MG/DL (ref 8.8–10.2)
CHLORIDE SERPL-SCNC: 97 MMOL/L (ref 98–107)
CO2 SERPL-SCNC: 27 MMOL/L (ref 20–29)
COLOR UR: YELLOW
CREAT SERPL-MCNC: 0.77 MG/DL (ref 0.8–1.3)
DIFFERENTIAL METHOD BLD: ABNORMAL
EOSINOPHIL # BLD: 0.01 K/UL (ref 0–0.8)
EOSINOPHIL NFR BLD: 0.2 % (ref 0.5–7.8)
ERYTHROCYTE [DISTWIDTH] IN BLOOD BY AUTOMATED COUNT: 12 % (ref 11.9–14.6)
FLUAV RNA SPEC QL NAA+PROBE: NOT DETECTED
FLUBV RNA SPEC QL NAA+PROBE: NOT DETECTED
GLOBULIN SER CALC-MCNC: 2.5 G/DL (ref 2.3–3.5)
GLUCOSE SERPL-MCNC: 101 MG/DL (ref 65–100)
GLUCOSE UR STRIP.AUTO-MCNC: NEGATIVE MG/DL
HCG UR QL: NEGATIVE
HCT VFR BLD AUTO: 37.7 % (ref 35.8–46.3)
HGB BLD-MCNC: 13.2 G/DL (ref 11.7–15.4)
HGB UR QL STRIP: NEGATIVE
IMM GRANULOCYTES # BLD AUTO: 0.19 K/UL (ref 0–0.5)
IMM GRANULOCYTES NFR BLD AUTO: 4.4 % (ref 0–5)
KETONES UR QL STRIP.AUTO: 40 MG/DL
LACTATE SERPL-SCNC: 1 MMOL/L (ref 0.5–2)
LEUKOCYTE ESTERASE UR QL STRIP.AUTO: NEGATIVE
LIPASE SERPL-CCNC: 125 U/L (ref 13–60)
LYMPHOCYTES # BLD: 0.89 K/UL (ref 0.5–4.6)
LYMPHOCYTES NFR BLD: 20.7 % (ref 13–44)
MCH RBC QN AUTO: 30.7 PG (ref 26.1–32.9)
MCHC RBC AUTO-ENTMCNC: 35 G/DL (ref 31.4–35)
MCV RBC AUTO: 87.7 FL (ref 82–102)
MONOCYTES # BLD: 0.64 K/UL (ref 0.1–1.3)
MONOCYTES NFR BLD: 14.9 % (ref 4–12)
NEUTS SEG # BLD: 2.54 K/UL (ref 1.7–8.2)
NEUTS SEG NFR BLD: 59.1 % (ref 43–78)
NITRITE UR QL STRIP.AUTO: NEGATIVE
NRBC # BLD: 0 K/UL (ref 0–0.2)
PH UR STRIP: 6 (ref 5–9)
PLATELET # BLD AUTO: 231 K/UL (ref 150–450)
PMV BLD AUTO: 9.1 FL (ref 9.4–12.3)
POTASSIUM SERPL-SCNC: 3.4 MMOL/L (ref 3.5–5.1)
PROCALCITONIN SERPL-MCNC: 0.19 NG/ML (ref 0–0.49)
PROT SERPL-MCNC: 6.2 G/DL (ref 6.3–8.2)
PROT UR STRIP-MCNC: NEGATIVE MG/DL
RBC # BLD AUTO: 4.3 M/UL (ref 4.05–5.2)
SARS-COV-2 RDRP RESP QL NAA+PROBE: NOT DETECTED
SODIUM SERPL-SCNC: 135 MMOL/L (ref 133–143)
SOURCE: NORMAL
SP GR UR REFRACTOMETRY: <=1.005 (ref 1–1.02)
UROBILINOGEN UR QL STRIP.AUTO: 0.2 EU/DL (ref 0.2–1)
WBC # BLD AUTO: 4.3 K/UL (ref 4.3–11.1)

## 2025-03-26 PROCEDURE — 83605 ASSAY OF LACTIC ACID: CPT

## 2025-03-26 PROCEDURE — 87154 CUL TYP ID BLD PTHGN 6+ TRGT: CPT

## 2025-03-26 PROCEDURE — 87040 BLOOD CULTURE FOR BACTERIA: CPT

## 2025-03-26 PROCEDURE — 80053 COMPREHEN METABOLIC PANEL: CPT

## 2025-03-26 PROCEDURE — 85025 COMPLETE CBC W/AUTO DIFF WBC: CPT

## 2025-03-26 PROCEDURE — 6360000002 HC RX W HCPCS

## 2025-03-26 PROCEDURE — 83690 ASSAY OF LIPASE: CPT

## 2025-03-26 PROCEDURE — 81003 URINALYSIS AUTO W/O SCOPE: CPT

## 2025-03-26 PROCEDURE — 6370000000 HC RX 637 (ALT 250 FOR IP)

## 2025-03-26 PROCEDURE — 99285 EMERGENCY DEPT VISIT HI MDM: CPT

## 2025-03-26 PROCEDURE — 2580000003 HC RX 258

## 2025-03-26 PROCEDURE — 87205 SMEAR GRAM STAIN: CPT

## 2025-03-26 PROCEDURE — 96375 TX/PRO/DX INJ NEW DRUG ADDON: CPT

## 2025-03-26 PROCEDURE — 87077 CULTURE AEROBIC IDENTIFY: CPT

## 2025-03-26 PROCEDURE — 87186 SC STD MICRODIL/AGAR DIL: CPT

## 2025-03-26 PROCEDURE — 84145 PROCALCITONIN (PCT): CPT

## 2025-03-26 PROCEDURE — 74177 CT ABD & PELVIS W/CONTRAST: CPT

## 2025-03-26 PROCEDURE — 96374 THER/PROPH/DIAG INJ IV PUSH: CPT

## 2025-03-26 PROCEDURE — 96361 HYDRATE IV INFUSION ADD-ON: CPT

## 2025-03-26 PROCEDURE — 87636 SARSCOV2 & INF A&B AMP PRB: CPT

## 2025-03-26 PROCEDURE — 81025 URINE PREGNANCY TEST: CPT

## 2025-03-26 PROCEDURE — 6360000004 HC RX CONTRAST MEDICATION

## 2025-03-26 RX ORDER — ONDANSETRON 2 MG/ML
4 INJECTION INTRAMUSCULAR; INTRAVENOUS
Status: COMPLETED | OUTPATIENT
Start: 2025-03-26 | End: 2025-03-26

## 2025-03-26 RX ORDER — KETOROLAC TROMETHAMINE 15 MG/ML
15 INJECTION, SOLUTION INTRAMUSCULAR; INTRAVENOUS ONCE
Status: COMPLETED | OUTPATIENT
Start: 2025-03-26 | End: 2025-03-26

## 2025-03-26 RX ORDER — DICYCLOMINE HCL 20 MG
20 TABLET ORAL 4 TIMES DAILY
Qty: 40 TABLET | Refills: 0 | Status: SHIPPED | OUTPATIENT
Start: 2025-03-26 | End: 2025-04-05

## 2025-03-26 RX ORDER — IOPAMIDOL 755 MG/ML
100 INJECTION, SOLUTION INTRAVASCULAR
Status: COMPLETED | OUTPATIENT
Start: 2025-03-26 | End: 2025-03-26

## 2025-03-26 RX ORDER — ACETAMINOPHEN 500 MG
1000 TABLET ORAL
Status: COMPLETED | OUTPATIENT
Start: 2025-03-26 | End: 2025-03-26

## 2025-03-26 RX ORDER — 0.9 % SODIUM CHLORIDE 0.9 %
1000 INTRAVENOUS SOLUTION INTRAVENOUS
Status: COMPLETED | OUTPATIENT
Start: 2025-03-26 | End: 2025-03-26

## 2025-03-26 RX ADMIN — ONDANSETRON 4 MG: 2 INJECTION, SOLUTION INTRAMUSCULAR; INTRAVENOUS at 18:10

## 2025-03-26 RX ADMIN — ACETAMINOPHEN 1000 MG: 500 TABLET ORAL at 18:10

## 2025-03-26 RX ADMIN — SODIUM CHLORIDE 1000 ML: 0.9 INJECTION, SOLUTION INTRAVENOUS at 18:06

## 2025-03-26 RX ADMIN — KETOROLAC TROMETHAMINE 15 MG: 15 INJECTION, SOLUTION INTRAMUSCULAR; INTRAVENOUS at 18:10

## 2025-03-26 RX ADMIN — IOPAMIDOL 100 ML: 755 INJECTION, SOLUTION INTRAVENOUS at 18:38

## 2025-03-26 ASSESSMENT — PAIN SCALES - GENERAL
PAINLEVEL_OUTOF10: 3
PAINLEVEL_OUTOF10: 2
PAINLEVEL_OUTOF10: 5
PAINLEVEL_OUTOF10: 1

## 2025-03-26 ASSESSMENT — PAIN DESCRIPTION - LOCATION
LOCATION: ABDOMEN
LOCATION: ABDOMEN

## 2025-03-26 ASSESSMENT — LIFESTYLE VARIABLES
HOW OFTEN DO YOU HAVE A DRINK CONTAINING ALCOHOL: NEVER
HOW MANY STANDARD DRINKS CONTAINING ALCOHOL DO YOU HAVE ON A TYPICAL DAY: PATIENT DOES NOT DRINK

## 2025-03-26 ASSESSMENT — PAIN DESCRIPTION - ORIENTATION: ORIENTATION: LOWER

## 2025-03-26 ASSESSMENT — PAIN - FUNCTIONAL ASSESSMENT: PAIN_FUNCTIONAL_ASSESSMENT: 0-10

## 2025-03-26 ASSESSMENT — PAIN DESCRIPTION - DESCRIPTORS: DESCRIPTORS: CRAMPING

## 2025-03-26 NOTE — ED TRIAGE NOTES
Pt ambulatory to triage with steady gait by self. PT c/o N/V/D, mid upper to lower abdominal pain that radiates to back, and body aches. Symptoms x6days. PT reports recently starting monjaro 2 weeks ago. Hx gallbladder removal.

## 2025-03-26 NOTE — ED PROVIDER NOTES
In the shared visit with the PA I independently examined and spoke with the patient.  She is a 45-year-old lady with nausea, vomiting, diarrhea, abdominal pain, and a fever.  She has had 2 total doses of Mounjaro with her last one 7 days ago.  She initially thought that she was perhaps having some side effects related to the Mounjaro but then she developed a fever.    On exam she has some tenderness in her lower abdomen more on the right than the left but some diffuse mild tenderness.  She had normal active bowel sounds    We will check some sepsis labs, check her urine, and do a CT scan to rule out appendicitis, diverticulitis, or other abdominal abnormality.    Patient's blood work is unremarkable.  CT does not reveal any significant abnormality.  There is some slight ascending colon inflammation.  Given her fever we will empirically treat with antibiotics.     Agapito Escobar MD  03/26/25 [id number]

## 2025-03-27 LAB
ACB COMPLEX DNA BLD POS QL NAA+NON-PROBE: NOT DETECTED
ACCESSION NUMBER, LLC1M: ABNORMAL
B FRAGILIS DNA BLD POS QL NAA+NON-PROBE: NOT DETECTED
BIOFIRE TEST COMMENT: ABNORMAL
BLACTX-M ISLT/SPM QL: NOT DETECTED
BLAIMP ISLT/SPM QL: NOT DETECTED
BLAKPC ISLT/SPM QL: NOT DETECTED
BLAOXA-48-LIKE ISLT/SPM QL: NOT DETECTED
BLAVIM ISLT/SPM QL: NOT DETECTED
C ALBICANS DNA BLD POS QL NAA+NON-PROBE: NOT DETECTED
C AURIS DNA BLD POS QL NAA+NON-PROBE: NOT DETECTED
C GATTII+NEOFOR DNA BLD POS QL NAA+N-PRB: NOT DETECTED
C GLABRATA DNA BLD POS QL NAA+NON-PROBE: NOT DETECTED
C KRUSEI DNA BLD POS QL NAA+NON-PROBE: NOT DETECTED
C PARAP DNA BLD POS QL NAA+NON-PROBE: NOT DETECTED
C TROPICLS DNA BLD POS QL NAA+NON-PROBE: NOT DETECTED
COLISTIN RES MCR-1 ISLT/SPM QL: NOT DETECTED
E CLOAC COMP DNA BLD POS NAA+NON-PROBE: NOT DETECTED
E COLI DNA BLD POS QL NAA+NON-PROBE: NOT DETECTED
E FAECALIS DNA BLD POS QL NAA+NON-PROBE: NOT DETECTED
E FAECIUM DNA BLD POS QL NAA+NON-PROBE: NOT DETECTED
ENTEROBACTERALES DNA BLD POS NAA+N-PRB: DETECTED
GP B STREP DNA BLD POS QL NAA+NON-PROBE: NOT DETECTED
HAEM INFLU DNA BLD POS QL NAA+NON-PROBE: NOT DETECTED
K OXYTOCA DNA BLD POS QL NAA+NON-PROBE: NOT DETECTED
KLEBSIELLA SP DNA BLD POS QL NAA+NON-PRB: NOT DETECTED
KLEBSIELLA SP DNA BLD POS QL NAA+NON-PRB: NOT DETECTED
L MONOCYTOG DNA BLD POS QL NAA+NON-PROBE: NOT DETECTED
N MEN DNA BLD POS QL NAA+NON-PROBE: NOT DETECTED
P AERUGINOSA DNA BLD POS NAA+NON-PROBE: NOT DETECTED
PROTEUS SP DNA BLD POS QL NAA+NON-PROBE: NOT DETECTED
RESISTANT GENE NDM BY PCR: NOT DETECTED
RESISTANT GENE TARGETS: ABNORMAL
S AUREUS DNA BLD POS QL NAA+NON-PROBE: NOT DETECTED
S AUREUS+CONS DNA BLD POS NAA+NON-PROBE: NOT DETECTED
S EPIDERMIDIS DNA BLD POS QL NAA+NON-PRB: NOT DETECTED
S LUGDUNENSIS DNA BLD POS QL NAA+NON-PRB: NOT DETECTED
S MALTOPHILIA DNA BLD POS QL NAA+NON-PRB: NOT DETECTED
S MARCESCENS DNA BLD POS NAA+NON-PROBE: NOT DETECTED
S PNEUM DNA BLD POS QL NAA+NON-PROBE: NOT DETECTED
S PYO DNA BLD POS QL NAA+NON-PROBE: NOT DETECTED
SALMONELLA DNA BLD POS QL NAA+NON-PROBE: DETECTED
STREPTOCOCCUS DNA BLD POS NAA+NON-PROBE: NOT DETECTED

## 2025-03-27 NOTE — DISCHARGE INSTRUCTIONS
Please begin taking the antibiotics as prescribed.  Continue to stay hydrated and push fluids.  Can take the Bentyl as needed for abdominal discomfort.  Continue taking Zofran for nausea.    If you do begin to experience any continued fevers, worsening abdominal pain, uncontrolled vomiting, or any new or worsening symptoms return to the ED immediately.

## 2025-03-27 NOTE — ED PROVIDER NOTES
This is a 44-year-old female that is seen in the emergency department yesterday for her GI symptoms.  She had workup to include blood work, blood cultures, and CT imaging.  CT imaging showed mild colitis.  She was discharged with prescription for Augmentin.  We received a phone call today notifying us that her blood cultures were positive for Salmonella x 1 culture.  I called and followed up with the patient.  She states that she has had no further fevers.  Continues to have some mild abdominal and low back discomfort.  She is not having any vomiting and only occasional episodes of diarrhea.  Had a discussion about the results of her blood cultures and antibiotic coverage.  Patient did not want to have her antibiotics switched to a radha quinolone or Bactrim.  Looking of sensitivities to bacteremia Salmonella amoxicillin as well as an alternative treatments I believe that Augmentin is reasonable.  However I did ask her that if she should have any further fevers or symptoms or not improving within the next 24 hours that she return to the emergency department immediately for repeat evaluation.    DO Maribell Stevens, Naya Barajas DO  03/27/25 7511

## 2025-03-29 ENCOUNTER — RESULTS FOLLOW-UP (OUTPATIENT)
Dept: EMERGENCY DEPT | Age: 45
End: 2025-03-29

## 2025-03-29 LAB
BACTERIA SPEC CULT: ABNORMAL
GRAM STN SPEC: ABNORMAL
SERVICE CMNT-IMP: ABNORMAL

## 2025-03-29 NOTE — ED PROVIDER NOTES
Lab called about the final report patient's blood culture did grow Salmonella.  This was known 2 days ago, identified by bio fire.  Patient was contacted by the ER provider 2 days ago and discussed further medical management.  No indication for any repeat contact with patient at this time     Dane Gallo MD  03/29/25 [id number]

## 2025-04-09 LAB
BACTERIA SPEC CULT: ABNORMAL
GRAM STN SPEC: ABNORMAL
SERVICE CMNT-IMP: ABNORMAL

## 2025-04-10 SDOH — ECONOMIC STABILITY: FOOD INSECURITY: WITHIN THE PAST 12 MONTHS, YOU WORRIED THAT YOUR FOOD WOULD RUN OUT BEFORE YOU GOT MONEY TO BUY MORE.: NEVER TRUE

## 2025-04-10 SDOH — ECONOMIC STABILITY: FOOD INSECURITY: WITHIN THE PAST 12 MONTHS, THE FOOD YOU BOUGHT JUST DIDN'T LAST AND YOU DIDN'T HAVE MONEY TO GET MORE.: NEVER TRUE

## 2025-04-10 SDOH — ECONOMIC STABILITY: INCOME INSECURITY: IN THE LAST 12 MONTHS, WAS THERE A TIME WHEN YOU WERE NOT ABLE TO PAY THE MORTGAGE OR RENT ON TIME?: NO

## 2025-04-10 SDOH — ECONOMIC STABILITY: TRANSPORTATION INSECURITY
IN THE PAST 12 MONTHS, HAS LACK OF TRANSPORTATION KEPT YOU FROM MEETINGS, WORK, OR FROM GETTING THINGS NEEDED FOR DAILY LIVING?: NO

## 2025-04-10 SDOH — ECONOMIC STABILITY: TRANSPORTATION INSECURITY
IN THE PAST 12 MONTHS, HAS THE LACK OF TRANSPORTATION KEPT YOU FROM MEDICAL APPOINTMENTS OR FROM GETTING MEDICATIONS?: NO

## 2025-04-10 ASSESSMENT — PATIENT HEALTH QUESTIONNAIRE - PHQ9
5. POOR APPETITE OR OVEREATING: NOT AT ALL
SUM OF ALL RESPONSES TO PHQ QUESTIONS 1-9: 1
10. IF YOU CHECKED OFF ANY PROBLEMS, HOW DIFFICULT HAVE THESE PROBLEMS MADE IT FOR YOU TO DO YOUR WORK, TAKE CARE OF THINGS AT HOME, OR GET ALONG WITH OTHER PEOPLE: NOT DIFFICULT AT ALL
SUM OF ALL RESPONSES TO PHQ QUESTIONS 1-9: 1
4. FEELING TIRED OR HAVING LITTLE ENERGY: NOT AT ALL
4. FEELING TIRED OR HAVING LITTLE ENERGY: NOT AT ALL
2. FEELING DOWN, DEPRESSED OR HOPELESS: NOT AT ALL
SUM OF ALL RESPONSES TO PHQ QUESTIONS 1-9: 1
9. THOUGHTS THAT YOU WOULD BE BETTER OFF DEAD, OR OF HURTING YOURSELF: NOT AT ALL
SUM OF ALL RESPONSES TO PHQ QUESTIONS 1-9: 1
9. THOUGHTS THAT YOU WOULD BE BETTER OFF DEAD, OR OF HURTING YOURSELF: NOT AT ALL
6. FEELING BAD ABOUT YOURSELF - OR THAT YOU ARE A FAILURE OR HAVE LET YOURSELF OR YOUR FAMILY DOWN: NOT AT ALL
2. FEELING DOWN, DEPRESSED OR HOPELESS: NOT AT ALL
8. MOVING OR SPEAKING SO SLOWLY THAT OTHER PEOPLE COULD HAVE NOTICED. OR THE OPPOSITE - BEING SO FIDGETY OR RESTLESS THAT YOU HAVE BEEN MOVING AROUND A LOT MORE THAN USUAL: NOT AT ALL
SUM OF ALL RESPONSES TO PHQ QUESTIONS 1-9: 1
10. IF YOU CHECKED OFF ANY PROBLEMS, HOW DIFFICULT HAVE THESE PROBLEMS MADE IT FOR YOU TO DO YOUR WORK, TAKE CARE OF THINGS AT HOME, OR GET ALONG WITH OTHER PEOPLE: NOT DIFFICULT AT ALL
7. TROUBLE CONCENTRATING ON THINGS, SUCH AS READING THE NEWSPAPER OR WATCHING TELEVISION: NOT AT ALL
8. MOVING OR SPEAKING SO SLOWLY THAT OTHER PEOPLE COULD HAVE NOTICED. OR THE OPPOSITE, BEING SO FIGETY OR RESTLESS THAT YOU HAVE BEEN MOVING AROUND A LOT MORE THAN USUAL: NOT AT ALL
7. TROUBLE CONCENTRATING ON THINGS, SUCH AS READING THE NEWSPAPER OR WATCHING TELEVISION: NOT AT ALL
1. LITTLE INTEREST OR PLEASURE IN DOING THINGS: NOT AT ALL
6. FEELING BAD ABOUT YOURSELF - OR THAT YOU ARE A FAILURE OR HAVE LET YOURSELF OR YOUR FAMILY DOWN: NOT AT ALL
5. POOR APPETITE OR OVEREATING: NOT AT ALL
3. TROUBLE FALLING OR STAYING ASLEEP: SEVERAL DAYS
1. LITTLE INTEREST OR PLEASURE IN DOING THINGS: NOT AT ALL
3. TROUBLE FALLING OR STAYING ASLEEP: SEVERAL DAYS

## 2025-04-11 ENCOUNTER — OFFICE VISIT (OUTPATIENT)
Dept: INTERNAL MEDICINE CLINIC | Facility: CLINIC | Age: 45
End: 2025-04-11
Payer: COMMERCIAL

## 2025-04-11 VITALS
DIASTOLIC BLOOD PRESSURE: 72 MMHG | SYSTOLIC BLOOD PRESSURE: 104 MMHG | BODY MASS INDEX: 25.58 KG/M2 | WEIGHT: 139 LBS | HEIGHT: 62 IN

## 2025-04-11 DIAGNOSIS — E55.9 VITAMIN D DEFICIENCY: ICD-10-CM

## 2025-04-11 DIAGNOSIS — F32.A DEPRESSION, UNSPECIFIED DEPRESSION TYPE: Primary | ICD-10-CM

## 2025-04-11 DIAGNOSIS — E78.2 MIXED HYPERLIPIDEMIA: ICD-10-CM

## 2025-04-11 DIAGNOSIS — R53.83 FATIGUE, UNSPECIFIED TYPE: ICD-10-CM

## 2025-04-11 PROCEDURE — 99214 OFFICE O/P EST MOD 30 MIN: CPT | Performed by: PHYSICIAN ASSISTANT

## 2025-04-11 RX ORDER — VITAMIN B COMPLEX
1 CAPSULE ORAL DAILY
COMMUNITY

## 2025-04-11 RX ORDER — BUPROPION HYDROCHLORIDE 150 MG/1
150 TABLET ORAL EVERY MORNING
Qty: 90 TABLET | Refills: 1 | Status: SHIPPED | OUTPATIENT
Start: 2025-04-11

## 2025-04-11 ASSESSMENT — ENCOUNTER SYMPTOMS
VOMITING: 0
DIARRHEA: 0
SHORTNESS OF BREATH: 0
CONSTIPATION: 0
EYE PAIN: 0
SORE THROAT: 0
VOICE CHANGE: 0
COUGH: 0
ABDOMINAL PAIN: 0
NAUSEA: 0
CHEST TIGHTNESS: 0
COLOR CHANGE: 0
WHEEZING: 0

## 2025-04-11 NOTE — PROGRESS NOTES
type  Comments:  h/o depression in famiy and in her past, exersizing, advised to cont, wants to cont for now  Orders:  -     buPROPion (WELLBUTRIN XL) 150 MG extended release tablet; Take 1 tablet by mouth every morning    Fatigue, unspecified type  Comments:  h/o depression in famiy  and in her past, exersizing, advised to cont,  Orders:  -     buPROPion (WELLBUTRIN XL) 150 MG extended release tablet; Take 1 tablet by mouth every morning    Mixed hyperlipidemia  -     CBC with Auto Differential; Future  -     Comprehensive Metabolic Panel; Future  -     Lipid Panel; Future  -     TSH; Future    Vitamin D deficiency  -     Vitamin D 25 Hydroxy; Future          FOLLOW UP  Return in about 6 months (around 10/11/2025) for  please schedule, CPE labs and CPE 1 week after.       The patient (or guardian, if applicable) and other individuals in attendance with the patient were advised that Artificial Intelligence will be utilized during this visit to record, process the conversation to generate a clinical note, and support improvement of the AI technology. The patient (or guardian, if applicable) and other individuals in attendance at the appointment consented to the use of AI, including the recording.

## 2025-08-11 RX ORDER — PANTOPRAZOLE SODIUM 40 MG/1
40 TABLET, DELAYED RELEASE ORAL
Qty: 90 TABLET | Refills: 3 | Status: SHIPPED | OUTPATIENT
Start: 2025-08-11

## (undated) DEVICE — GUARD TEETH AD NYL FOR LARYNSCP POS PROTCT

## (undated) DEVICE — GLOVE ORANGE PI 7 1/2   MSG9075

## (undated) DEVICE — PACKING 8004001 NEURAY 200PK 19X19MM: Brand: NEURAY ®

## (undated) DEVICE — KIT PROCEDURE SURG T AND A ORAL TOTE

## (undated) DEVICE — SPONGE,NEURO,0.5"X0.5",XR,STRL,10/PK: Brand: MEDLINE

## (undated) DEVICE — PAD,NON-ADHERENT,3X8,STERILE,LF,1/PK: Brand: MEDLINE